# Patient Record
Sex: MALE | Race: WHITE | NOT HISPANIC OR LATINO | Employment: OTHER | URBAN - METROPOLITAN AREA
[De-identification: names, ages, dates, MRNs, and addresses within clinical notes are randomized per-mention and may not be internally consistent; named-entity substitution may affect disease eponyms.]

---

## 2018-05-02 ENCOUNTER — TRANSFERRED RECORDS (OUTPATIENT)
Dept: HEALTH INFORMATION MANAGEMENT | Facility: CLINIC | Age: 28
End: 2018-05-02

## 2019-08-12 ENCOUNTER — COMMUNICATION - HEALTHEAST (OUTPATIENT)
Dept: TELEHEALTH | Facility: CLINIC | Age: 29
End: 2019-08-12

## 2019-08-12 ENCOUNTER — OFFICE VISIT - HEALTHEAST (OUTPATIENT)
Dept: FAMILY MEDICINE | Facility: CLINIC | Age: 29
End: 2019-08-12
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
## 2019-08-12 ASSESSMENT — MIFFLIN-ST. JEOR: SCORE: 1746.72

## 2020-03-15 ENCOUNTER — HEALTH MAINTENANCE LETTER (OUTPATIENT)
Age: 30
End: 2020-03-15

## 2020-08-21 ENCOUNTER — OFFICE VISIT - HEALTHEAST (OUTPATIENT)
Dept: FAMILY MEDICINE | Facility: CLINIC | Age: 30
End: 2020-08-21

## 2020-08-21 DIAGNOSIS — Z91.030 BEE STING ALLERGY: ICD-10-CM

## 2020-08-21 RX ORDER — EPINEPHRINE 0.3 MG/.3ML
0.3 INJECTION SUBCUTANEOUS PRN
Qty: 1 | Refills: 0 | Status: SHIPPED | OUTPATIENT
Start: 2020-08-21 | End: 2024-03-27

## 2020-09-02 ENCOUNTER — AMBULATORY - HEALTHEAST (OUTPATIENT)
Dept: FAMILY MEDICINE | Facility: CLINIC | Age: 30
End: 2020-09-02

## 2020-09-02 ENCOUNTER — VIRTUAL VISIT (OUTPATIENT)
Dept: FAMILY MEDICINE | Facility: OTHER | Age: 30
End: 2020-09-02
Payer: COMMERCIAL

## 2020-09-02 DIAGNOSIS — Z20.822 SUSPECTED COVID-19 VIRUS INFECTION: ICD-10-CM

## 2020-09-02 PROCEDURE — 99421 OL DIG E/M SVC 5-10 MIN: CPT | Performed by: FAMILY MEDICINE

## 2020-09-03 NOTE — PROGRESS NOTES
"Date: 2020 08:08:02  Clinician: Candace Jung  Clinician NPI: [id number]  Patient: Garcia Sanches  Patient : 1990  Patient Address: [address] [place] [address] [address] [id number]  Patient Phone: [phone]  Visit Protocol: URI  Patient Summary:  Garcia is a 30 year old ( : 1990 ) male who initiated a Visit for COVID-19 (Coronavirus) evaluation and screening. When asked the question \"Please sign me up to receive news, health information and promotions from [employer].\", Garcia responded \"No\".    When asked when his symptoms started, Garcia reported that he does not have any symptoms.   He denies taking antibiotic medication in the past month and having recent facial or sinus surgery in the past 60 days.    Pertinent COVID-19 (Coronavirus) information  In the past 14 days, Garcia has not worked in a congregate living setting.   He does not work or volunteer as healthcare worker or a  and does not work or volunteer in a healthcare facility.   Garcia also has not lived in a congregate living setting in the past 14 days. He lives with a healthcare worker.   Garcia has had a close contact with a laboratory-confirmed COVID-19 patient in the last 14 days. Additional information about contact with COVID-19 (Coronavirus) patient as reported by the patient (free text): I am living with my wife who is a healthcare professional and she received her positive COVID results last night. We have been in close contact since she started to show symptoms including sleeping in the same bed and kissing.    Patient reported they are living in the same household with a COVID-19 positive patient.  Since 2019, Garcia and has not had upper respiratory infection or influenza-like illness. Has not been diagnosed with lab-confirmed COVID-19 test   Pertinent medical history  Garcia needs a return to work/school note.   Weight: 146 lbs   Garcia does not smoke or use smokeless tobacco.   Weight: 146 lbs    MEDICATIONS: No " current medications, ALLERGIES: NKDA  Clinician Response:  Dear Garcia,   Based on your exposure to COVID-19 (coronavirus), we would like to test you for this virus.  1. Please call [phone] to schedule your visit. Explain that you were referred by [facility] to have a COVID-19 test. Be ready [facility].  The following will serve as your written order for this COVID Test, ordered by me, for the indication of suspected COVID [Z20.828]: The test will be ordered in [employer], our electronic health record, after you are scheduled. It will show as ordered and authorized by Ankur Haskins MD.  Order: COVID-19 (coronavirus) PCR for ASYMPTOMATIC EXPOSURE testing from [facility].  If you know you have had close contact with someone who tested positive, you should be quarantined for 14 days after this exposure. You should stay in quarantine for the14 days even if the covid test is negative, the optimal time to test after exposure is 5-7 days from the exposure  Quarantine means   What should I do?  For safety, it's very important to follow these rules. Do this for 14 days after the date you were last exposed to the virus..  Stay home and away from others. Don't go to school or anywhere else. Generally quarantine means staying home from work but there are some exceptions to this. Please contact your workplace.   No hugging, kissing or shaking hands.  Don't let anyone visit.  Cover your mouth and nose with a mask, tissue or washcloth to avoid spreading germs.  Wash your hands and face often. Use soap and water.  What are the symptoms of COVID-19?  The most common symptoms are cough, fever and trouble breathing. Less common symptoms include headache, body aches, fatigue (feeling very tired), chills, sore throat, stuffy or runny nose, diarrhea (loose poop), loss of taste or smell, belly pain, and nausea or vomiting (feeling sick to your stomach or throwing up).  After 14 days, if you have still don't have symptoms, you  likely don't have this virus.  If you develop symptoms, follow these guidelines.  If you're normally healthy: Please start another [facility] visit to report your symptoms. Go to [email or web address].  If you have a serious health problem (like cancer, heart failure, an organ transplant or kidney disease): Call your specialty clinic. Let them know that you might have COVID-19.  2. When it's time for your COVID test:  Stay at least 6 feet away from others. (If someone will drive you to your test, stay in the backseat, as far away from the  as you can.)  Cover your mouth and nose with a mask, tissue or washcloth.  Go straight to the testing site. Don't make any stops on the way there or back.  Please note  Caregivers in these groups are at risk for severe illness due to COVID-19:  o People 65 years and older  o People who live in a nursing home or long-term care facility  o People with chronic disease (lung, heart, cancer, diabetes, kidney, liver, immunologic)  o People who have a weakened immune system, including those who:  Are in cancer treatment  Take medicine that weakens the immune system, such as corticosteroids  Had a bone marrow or organ transplant  Have an immune deficiency  Have poorly controlled HIV or AIDS  Are obese (body mass index of 40 or higher)  Smoke regularly  Where can I get more information?  [facility] -- About COVID-19: [email or web address]  CDC -- What to Do If You're Sick: www.cdc.gov/coronavirus/2019-ncov/about/steps-when-sick.html  CDC -- Ending Home Isolation: www.cdc.gov/coronavirus/2019-ncov/hcp/disposition-in-home-patients.html  CDC -- Caring for Someone: www.cdc.gov/coronavirus/2019-ncov/if-you-are-sick/care-for-someone.html  [facility] -- Interim Guidance for Hospital Discharge to Home: www.health.[facility].mn.us/diseases/coronavirus/hcp/hospdischarge.pdf  [facility] clinical trials (COVID-19 research studies): clinicalaffairs.[facility]/n-clinical-trials  Below are the  COVID-19 hotlines at the [place] Department of Health ([facility]). Interpreters are available.  For health questions: Call 426-343-7739 or 1-625.585.6632 (7 a.m. to 7 p.m.)  For questions about schools and childcare: Call 892-133-2942 or 1-778.740.8778 (7 a.m. to 7 p.m.)    Diagnosis: Contact with and (suspected) exposure to other viral communicable diseases  Diagnosis ICD: Z20.828

## 2020-09-04 ENCOUNTER — COMMUNICATION - HEALTHEAST (OUTPATIENT)
Dept: SCHEDULING | Facility: CLINIC | Age: 30
End: 2020-09-04

## 2020-09-21 ENCOUNTER — OFFICE VISIT - HEALTHEAST (OUTPATIENT)
Dept: ALLERGY | Facility: CLINIC | Age: 30
End: 2020-09-21

## 2020-09-21 DIAGNOSIS — Z91.030 BEE STING ALLERGY: ICD-10-CM

## 2020-10-29 ENCOUNTER — OFFICE VISIT - HEALTHEAST (OUTPATIENT)
Dept: ALLERGY | Facility: CLINIC | Age: 30
End: 2020-10-29

## 2020-10-29 DIAGNOSIS — Z91.030 BEE STING ALLERGY: ICD-10-CM

## 2020-11-03 ENCOUNTER — COMMUNICATION - HEALTHEAST (OUTPATIENT)
Dept: ALLERGY | Facility: CLINIC | Age: 30
End: 2020-11-03

## 2020-11-03 LAB — PAPER WASP IGE QN: <0.1 KU(A)/L

## 2020-11-24 ENCOUNTER — VIRTUAL VISIT (OUTPATIENT)
Dept: FAMILY MEDICINE | Facility: OTHER | Age: 30
End: 2020-11-24
Payer: COMMERCIAL

## 2020-11-24 PROCEDURE — 99421 OL DIG E/M SVC 5-10 MIN: CPT | Performed by: PHYSICIAN ASSISTANT

## 2020-11-25 ENCOUNTER — AMBULATORY - HEALTHEAST (OUTPATIENT)
Dept: FAMILY MEDICINE | Facility: CLINIC | Age: 30
End: 2020-11-25

## 2020-11-25 DIAGNOSIS — Z20.822 SUSPECTED 2019 NOVEL CORONAVIRUS INFECTION: ICD-10-CM

## 2020-11-25 NOTE — PROGRESS NOTES
"Date: 2020 20:36:03  Clinician: Bev Blank  Clinician NPI: [id number]  Patient: Garcia Sanches  Patient : 1990  Patient Address: [address] [place] [address] [place] [id number]  Patient Phone: [phone]  Visit Protocol: URI  Patient Summary:  Garcia is a 30 year old ( : 1990 ) male who initiated a [facility] Visit for COVID-19 (Coronavirus) evaluation and screening. When asked the question \"Please sign me up to receive news, health information and promotions from [facility].\", Garcia responded \"No\".    Garcia states his symptoms started gradually 5-6 days ago.   His symptoms consist of malaise and a cough.   Symptom details   Cough: Garcia coughs every 5-10 minutes and his cough is not more bothersome at night. Phlegm does not come into his throat when he coughs. He does not believe his cough is caused by post-nasal drip.    Garcia denies having vomiting, rhinitis, facial pain or pressure, myalgias, chills, sore throat, teeth pain, ageusia, diarrhea, ear pain, headache, wheezing, fever, nasal congestion, nausea, and anosmia. He also denies taking antibiotic medication in the past month, having recent facial or sinus surgery in the past 60 days, and double sickening (worsening symptoms after initial improvement). He is not experiencing dyspnea.   Precipitating events  He has not recently been exposed to someone with influenza. Garcia has been in close contact with the following high risk individuals: pregnant women and people with asthma, heart disease or diabetes.   Pertinent COVID-19 (Coronavirus) information  Garcia does not work or volunteer as healthcare worker or a . In the past 14 days, Garcia has not worked or volunteered at a healthcare facility or group living setting.   In the past 14 days, he also has not lived in a congregate living setting.   Garcia has not had a close contact with a laboratory-confirmed COVID-19 patient within 14 days of symptom onset.    Since 2019, Garcia has been " tested for COVID-19 and has not had upper respiratory infection or influenza-like illness.      Result of COVID-19 test: Negative     Date of his COVID-19 test: 11/09/2020      Pertinent medical history  Garcia does not need a return to work/school note.   Weight: 150 lbs   Garcia does not smoke or use smokeless tobacco.   Additional information as reported by the patient (free text): I traveled to [place] for work from 10/29/20 - 11/3/20 and was tested before and after (both negative).  My wife had a confirmed covid test 8/30/20.  Her symptoms were similar to how I am feeling now.  My wife is 30 weeks pregnant and I just want to make sure I limit my exposure until I find out results.   Weight: 150 lbs    MEDICATIONS: Mucinex DM oral, ALLERGIES: NKDA  Clinician Response:  Dear Garcia,   Your symptoms show that you may have coronavirus (COVID-19). This illness can cause fever, cough and trouble breathing. Many people get a mild case and get better on their own. Some people can get very sick.  Based on the symptoms you have shared, I would like you to be re-checked in 2 to 3 days. Please call your family clinic to set up a video or phone visit.  Will I be tested for COVID-19?  We would like to test you for this virus.   Please call [phone] to schedule your visit. Explain that you were referred by [facility] to have a COVID-19 test. Be ready to [facility] ID [facility].   * If you need to schedule in [place] or [facility] please call [phone] or for [employer] employees please call [phone].    The following will serve as your written order for this COVID Test, ordered by me, for the indication of suspected COVID [Z20.828]: The test will be ordered in [employer], our electronic health record, after you are scheduled. It will show as ordered and authorized by Ankur Haskins MD.  Order: COVID-19 (Coronavirus) PCR for SYMPTOMATIC testing from [facility].   1.When it's time for your COVID test:   Stay at least 6 feet away  "from others. (If someone will drive you to your test, stay in the backseat, as far away from the  as you can.)   Cover your mouth and nose with a mask, tissue or washcloth.  Go straight to the testing site. Don't make any stops on the way there or back.      2.Starting now: Stay home and away from others (self-isolate) until:   You've had no fever---and no medicine that reduces fever---for one full day (24 hours). And...   Your other symptoms have gotten better. For example, your cough or breathing has improved. And...   At least 10 days have passed since your symptoms started.       During this time, don't leave the house except for testing or medical care.   Stay in your own room, even for meals. Use your own bathroom if you can.   Stay away from others in your home. No hugging, kissing or shaking hands. No visitors.  Don't go to work, school or anywhere else.    Clean \"high touch\" surfaces often (doorknobs, counters, handles, etc.). Use a household cleaning spray or wipes. You'll find a full list of  on the EPA website: www.epa.gov/pesticide-registration/list-n-disinfectants-use-against-sars-cov-2.   Cover your mouth and nose with a mask, tissue or washcloth to avoid spreading germs.  Wash your hands and face often. Use soap and water.  Caregivers in these groups are at risk for severe illness due to COVID-19:  o People 65 years and older  o People who live in a nursing home or long-term care facility  o People with chronic disease (lung, heart, cancer, diabetes, kidney, liver, immunologic)   o People who have a weakened immune system, including those who:   Are in cancer treatment  Take medicine that weakens the immune system, such as corticosteroids  Had a bone marrow or organ transplant  Have an immune deficiency  Have poorly controlled HIV or AIDS  Are obese (body mass index of 40 or higher)  Smoke regularly   o Caregivers should wear gloves while washing dishes, handling laundry and cleaning " bedrooms and bathrooms.  o Use caution when washing and drying laundry: Don't shake dirty laundry, and use the warmest water setting that you can.  o For more tips, go to www.cdc.gov/coronavirus/2019-ncov/downloads/10Things.pdf.      How can I take care of myself?   Get lots of rest. Drink extra fluids (unless a doctor has told you not to)   Take Tylenol (acetaminophen) for fever or pain. If you have liver or kidney problems, ask your family doctor if it's okay to take Tylenol.   Adults can take either:    650 mg (two 325 mg pills) every 4 to 6 hours, or...   1,000 mg (two 500 mg pills) every 8 hours as needed.    Note: Don't take more than 3,000 mg in one day. Acetaminophen is found in many medicines (both prescribed and over-the-counter medicines). Read all labels to be sure you don't take too much.   For children, check the Tylenol bottle for the right dose. The dose is based on the child's age or weight.    If you have other health problems (like cancer, heart failure, an organ transplant or severe kidney disease): Call your specialty clinic if you don't feel better in the next 2 days.       Know when to call 911. Emergency warning signs include:    Trouble breathing or shortness of breath Pain or pressure in the chest that doesn't go away Feeling confused like you haven't felt before, or not being able to wake up Bluish-colored lips or face  Where can I get more information?   [facility] -- About COVID-19: [email or web address]   CDC -- What to Do If You're Sick: www.cdc.gov/coronavirus/2019-ncov/about/steps-when-sick.html   CDC -- Ending Home Isolation: www.cdc.gov/coronavirus/2019-ncov/hcp/disposition-in-home-patients.html   CDC -- Caring for Someone: www.cdc.gov/coronavirus/2019-ncov/if-you-are-sick/care-for-someone.html   [facility] -- Interim Guidance for Hospital Discharge to Home: www.health.[facility].mn./diseases/coronavirus/hcp/hospdischarge.pdf   [facility] clinical trials  (COVID-19 research studies): clinicalaffairs.[facility]-clinical-trials    Below are the COVID-19 hotlines at the [place] Department of Health ([facility]). Interpreters are available.    For health questions: Call 448-722-1382 or 1-192.813.2553 (7 a.m. to 7 p.m.) For questions about schools and childcare: Call 865-842-6691 or 1-693.661.7466 (7 a.m. to 7 p.m.)       Diagnosis: Contact with and (suspected) exposure to other viral communicable diseases  Diagnosis ICD: Z20.828

## 2020-11-27 ENCOUNTER — COMMUNICATION - HEALTHEAST (OUTPATIENT)
Dept: SCHEDULING | Facility: CLINIC | Age: 30
End: 2020-11-27

## 2021-01-10 ENCOUNTER — HEALTH MAINTENANCE LETTER (OUTPATIENT)
Age: 31
End: 2021-01-10

## 2021-01-18 ENCOUNTER — OFFICE VISIT - HEALTHEAST (OUTPATIENT)
Dept: FAMILY MEDICINE | Facility: CLINIC | Age: 31
End: 2021-01-18

## 2021-01-18 DIAGNOSIS — Z13.220 ENCOUNTER FOR SCREENING FOR LIPOID DISORDERS: ICD-10-CM

## 2021-01-18 DIAGNOSIS — Z00.00 ENCOUNTER FOR GENERAL ADULT MEDICAL EXAMINATION WITHOUT ABNORMAL FINDINGS: ICD-10-CM

## 2021-01-18 LAB
ALBUMIN SERPL-MCNC: 4.8 G/DL (ref 3.5–5)
ALP SERPL-CCNC: 73 U/L (ref 45–120)
ALT SERPL W P-5'-P-CCNC: 18 U/L (ref 0–45)
ANION GAP SERPL CALCULATED.3IONS-SCNC: 10 MMOL/L (ref 5–18)
AST SERPL W P-5'-P-CCNC: 17 U/L (ref 0–40)
BILIRUB SERPL-MCNC: 1 MG/DL (ref 0–1)
BUN SERPL-MCNC: 14 MG/DL (ref 8–22)
CALCIUM SERPL-MCNC: 9.6 MG/DL (ref 8.5–10.5)
CHLORIDE BLD-SCNC: 103 MMOL/L (ref 98–107)
CHOLEST SERPL-MCNC: 191 MG/DL
CO2 SERPL-SCNC: 27 MMOL/L (ref 22–31)
CREAT SERPL-MCNC: 0.8 MG/DL (ref 0.7–1.3)
FASTING STATUS PATIENT QL REPORTED: YES
GFR SERPL CREATININE-BSD FRML MDRD: >60 ML/MIN/1.73M2
GLUCOSE BLD-MCNC: 87 MG/DL (ref 70–125)
HDLC SERPL-MCNC: 47 MG/DL
HGB BLD-MCNC: 15.3 G/DL (ref 14–18)
HIV 1+2 AB+HIV1 P24 AG SERPL QL IA: NEGATIVE
LDLC SERPL CALC-MCNC: 131 MG/DL
POTASSIUM BLD-SCNC: 3.7 MMOL/L (ref 3.5–5)
PROT SERPL-MCNC: 7.3 G/DL (ref 6–8)
SODIUM SERPL-SCNC: 140 MMOL/L (ref 136–145)
TRIGL SERPL-MCNC: 63 MG/DL

## 2021-01-18 ASSESSMENT — MIFFLIN-ST. JEOR: SCORE: 1674.77

## 2021-01-19 LAB
25(OH)D3 SERPL-MCNC: 27.5 NG/ML (ref 30–80)
25(OH)D3 SERPL-MCNC: 27.5 NG/ML (ref 30–80)
HCV AB SERPL QL IA: NEGATIVE

## 2021-05-08 ENCOUNTER — HEALTH MAINTENANCE LETTER (OUTPATIENT)
Age: 31
End: 2021-05-08

## 2021-05-26 VITALS — HEART RATE: 99 BPM | OXYGEN SATURATION: 70 %

## 2021-05-31 NOTE — PROGRESS NOTES
"ASSESSMENT:   1. Reaction to insect bite  predniSONE (DELTASONE) 20 MG tablet       PLAN:  Does appear to be a reaction to the insect bite as opposed to a cellulitis.  Will treat with a 5-day course of prednisone, antihistamines.  Did discuss signs and symptoms of cellulitis with patient and encouraged him to return should fevers develop, redness worsen.    I discussed red flag symptoms, return precautions to clinic/ER and follow up care with patient/parent.  Expected clinical course, symptomatic cares advised. Questions answered. Patient/parent amenable with plan.    Patient Instructions:  Patient Instructions   Start prednisone daily today.    Benadryl 25-50mg every 4-6 hours as needed for itching, irritation.    If fevers develop, redness worsens, please return for recheck.    Prednisone Discharge Instructions:  Please take the steroid, Prednisone, for the full course as prescribed.  Take Prednisone with food or milk to minimize stomach upset.      Side effects of Prednisone include difficulty sleeping, increased appetite, weight gain, and changes in mood.  If you are diabetic, please monitor your blood sugar regularly while taking this medicine as Prednisone can cause high blood sugar.        SUBJECTIVE:   Garcia Sanches is a 29 y.o. male who presents today with redness and swelling to his left lower leg.  Patient reports that yesterday he was hiking, and felt a definite sting or bite to his left lower leg.  He examined the area immediately, and noted a \"black dot\" with a white area surrounding this.  It did not immediately turn red.  He did feel a stinging sensation for several hours following this.  When he awakened today, there was a large area warmth and redness to his lower leg.  He also began to swell.  His girlfriend then marked it with ink, and they now notes that the redness has receded from of the lower portion, however it has extended beyond the line laterally.  He notes that it does not hurt at rest, " "he does feel some irritation when his pants touch it.  He has not had any fevers, ill symptoms.  No discharge or weeping from the area.  Denies shortness of breath, wheezing, difficulty swallowing, swelling of the lips or tongue, vomiting.  Has not taken any medication or antihistamines for symptoms.      ROS:  Comprehensive 12 pt ROS completed, positives noted in HPI, otherwise negative.      Past Medical History:  There is no problem list on file for this patient.    Denies  Surgical History:  No past surgical history on file.    Denies    Family History:  No family history on file.    Reviewed; Non-contributory    Social History     Tobacco Use   Smoking Status Never Smoker   Smokeless Tobacco Never Used         Current Medications:  No current outpatient medications on file prior to visit.     No current facility-administered medications on file prior to visit.        Allergies:   No Known Allergies    OBJECTIVE:   Vitals:    08/12/19 1448   BP: 134/89   Patient Site: Right Arm   Patient Position: Sitting   Cuff Size: Adult Regular   Pulse: 76   Resp: 20   Temp: 98.3  F (36.8  C)   TempSrc: Oral   Weight: 158 lb 1 oz (71.7 kg)   Height: 6' 2\" (1.88 m)     Physical exam reveals a pleasant 29 y.o. male.   General: Appears healthy, alert and cooperative. Non-toxic appearance.  Eyes: nonicteric  Neck: supple and no adenopathy  Pulmonary/Chest: Even, unlabored  Heart: regular rate  Left lower extremity: There is a significant area of erythema overlying the posterior portion of the left lower leg just proximal to the Achilles.  This area is warm to the touch.  It is nontender to palpation.  There is no definite puncture or bite.  There is a swelling noted from the mid calf to the ankle when compared to the opposite extremity.  Calf is nontender.  CMS is intact.  Neuro: Alert, oriented. Non-focal.  Skin: pink, warm, dry.  Psychiatric: Normal mood and affect.  Normal judgement and thought content. Normal behavior.     "   RADIOLOGY    none  LABORATORY STUDIES    none      Ami Raymond, CNP

## 2021-05-31 NOTE — PATIENT INSTRUCTIONS - HE
Start prednisone daily today.    Benadryl 25-50mg every 4-6 hours as needed for itching, irritation.    If fevers develop, redness worsens, please return for recheck.    Prednisone Discharge Instructions:  Please take the steroid, Prednisone, for the full course as prescribed.  Take Prednisone with food or milk to minimize stomach upset.      Side effects of Prednisone include difficulty sleeping, increased appetite, weight gain, and changes in mood.  If you are diabetic, please monitor your blood sugar regularly while taking this medicine as Prednisone can cause high blood sugar.

## 2021-06-03 VITALS — HEIGHT: 74 IN | BODY MASS INDEX: 20.28 KG/M2 | WEIGHT: 158.06 LBS

## 2021-06-05 VITALS
WEIGHT: 145.7 LBS | HEIGHT: 73 IN | SYSTOLIC BLOOD PRESSURE: 124 MMHG | BODY MASS INDEX: 19.31 KG/M2 | DIASTOLIC BLOOD PRESSURE: 80 MMHG | HEART RATE: 66 BPM

## 2021-06-10 NOTE — PROGRESS NOTES
"Garcia Sanches is a 30 y.o. male who is being evaluated via a billable telephone visit.      The patient has been notified of following:     \"This telephone visit will be conducted via a call between you and your physician/provider. We have found that certain health care needs can be provided without the need for a physical exam.  This service lets us provide the care you need with a short phone conversation.  If a prescription is necessary we can send it directly to your pharmacy.  If lab work is needed we can place an order for that and you can then stop by our lab to have the test done at a later time.    Telephone visits are billed at different rates depending on your insurance coverage. During this emergency period, for some insurers they may be billed the same as an in-person visit.  Please reach out to your insurance provider with any questions.    If during the course of the call the physician/provider feels a telephone visit is not appropriate, you will not be charged for this service.\"    Patient has given verbal consent to a Telephone visit? Yes    What phone number would you like to be contacted at? [phone].    Patient would like to receive their AVS by N/A.    Additional provider notes:     New patient patient presents today with concerns of worsening reactions to bee stings.  He has had multiple bee stings which have led to significant swelling, erythema, and pain.  Most recently had a bee sting along his face which caused significant angioedema and a little bit of wheezing as well.  In the past he has used prednisone for this.  He also has over-the-counter cetirizine and diphenhydramine.  He has concerns about what to do for the next time of bee stings him and also is interested in allergy testing to confirm bee sting allergy.    Assessment/Plan:  1. Bee sting allergy    Discussed with the patient that he most likely does have an allergy to bee/wasp venom.  Discussed how these can at times " progressively become more severe with each subsequent sting.  Given the fact that he had some wheezing last time, concerns for increased risk of compromised airway should this happen again.  EpiPen to hold onto sent to the pharmacy.  Should he get localized reaction, prednisone to hold onto.  He would still like to discuss with an allergist.  Referral placed.    - EPINEPHrine (EPIPEN/ADRENACLICK/AUVI-Q) 0.3 mg/0.3 mL injection; Inject 0.3 mL (0.3 mg total) as directed as needed for anaphylaxis. Inject into thigh.  Dispense: 1 Pre-filled Pen Syringe; Refill: 0  - predniSONE (DELTASONE) 20 MG tablet; Take 40 mg by mouth daily for 5 days.  Dispense: 10 tablet; Refill: 0  - Ambulatory referral to Allergy        Phone call duration:  11 minutes

## 2021-06-11 NOTE — PROGRESS NOTES
"Garcia Sanches is a 30 y.o. male who is being evaluated via a billable video visit.      The patient has been notified of following:     \"This video visit will be conducted via a call between you and your physician/provider. We have found that certain health care needs can be provided without the need for an in-person physical exam.  This service lets us provide the care you need with a video conversation.  If a prescription is necessary we can send it directly to your pharmacy.  If lab work is needed we can place an order for that and you can then stop by our lab to have the test done at a later time.    Video visits are billed at different rates depending on your insurance coverage. Please reach out to your insurance provider with any questions.    If during the course of the call the physician/provider feels a video visit is not appropriate, you will not be charged for this service.\"    Patient has given verbal consent to a Video visit? yes  How would you like to obtain your AVS? My chart  If dropped by the video visit, the video invitation should be sent to:[phone]  Will anyone else be joining your video visit? no        Video Start Time:0808    Additional provider notes: Chief complaint: Bee sting allergy    History of present illness: This is a pleasant 30-year-old gentleman I was asked to see for evaluation by Bryan Gillis in regards to bee sting allergy.  Patient states a couple years ago he was in [place] and was stung on his ankle.  He thinks it was a wasp.  He states his entire joint swelled and throb.  He was given prednisone and the symptoms did resolve within a week.  He states last year he was stung on the other ankle and had a similar response.  He states the ankle became very red and swollen.  It turned purple.  The prednisone helped again.  This summer he is been stung 3 times.  2 of the swellings he had very large local swelling.  He states about a month ago he was stung on his left temple and his " whole face swelled and his left eye swelled shut.  He states he also had some wheezing.  This is the first time he had any breathing difficulty.  He states that resolved spontaneously.  He took some prednisone and Benadryl and the symptoms did resolve.  He is now has a prescription for prednisone and an EpiPen at home.  He thinks he was stung by a wasp.  He states he works outside as a .    Past medical history: Otherwise unremarkable    Social history: As listed in the history present illness, non-smoker    Family history: Noncontributory    Review of Systems performed as above and the remainder is negative.      Current Outpatient Medications:      EPINEPHrine (EPIPEN/ADRENACLICK/AUVI-Q) 0.3 mg/0.3 mL injection, Inject 0.3 mL (0.3 mg total) as directed as needed for anaphylaxis. Inject into thigh., Disp: 1 Pre-filled Pen Syringe, Rfl: 0    Allergies   Allergen Reactions     Bee Venom Protein (Honey Bee)      Weight 150 pounds, height 6 foot 2 inches tall, respiratory rate 14    Gen: Pleasant male not in acute distress  HEENT: Eyes no erythema of the bulbar or palpebral conjunctiva, no edema.  Nose: No congestion,  Mouth: Throat clear, no lip or tongue edema.   Neck: No swelling, full range of motion  Respiratory: No coughing with breathing, no retractions  Lymph: No supraclavicular or cervical lymphadenopathy  Skin: No rashes or lesions  Psych: Alert and oriented times 3    Impression report and plan:  1.  Bee sting allergy    I would like him to come in for skin testing.  I went over this process with him.  Continue to carry his epinephrine device.  I will provide him with a copy of anaphylaxis action plan.  I reviewed this with him today.  I did discuss briefly with him allergy shots.  He would be a candidate.  Stated that large local swelling but not qualifying for testing or shots, however, the wheezing does constitute as a systemic response.  If he has any further questions, he will  let me know.  We will schedule him for skin testing.      Video-Visit Details    Type of service:  Video Visit    Video End Time (time video stopped): 0820  Originating Location (pt. Location): home    Distant Location (provider location):  [facility]     Platform used for Video Visit:[employer]

## 2021-06-11 NOTE — PATIENT INSTRUCTIONS - HE
Epi at all times    Bee sting allergy testing 1-2 hours, off antihistamines for 5 days    Anaphylaxis action plan    ? Bee sting allergy shots

## 2021-06-12 NOTE — PROGRESS NOTES
Chief complaint: Bee sting allergy    History of present illness: This is a pleasant 30-year-old gentleman I saw virtually in September.  He had been stung and he had systemic symptoms of breathing difficulty and swelling of the face.  He is not sure what stung him.  He reports he is feeling well today.    Past medical history, social history, family medical history, meds and allergies reviewed and updated accordingly.        Review of Systems performed as above and the remainder is negative.         Current Outpatient Medications:      EPINEPHrine (EPIPEN/ADRENACLICK/AUVI-Q) 0.3 mg/0.3 mL injection, Inject 0.3 mL (0.3 mg total) as directed as needed for anaphylaxis. Inject into thigh., Disp: 1 Pre-filled Pen Syringe, Rfl: 0    Allergies   Allergen Reactions     Bee Venom Protein (Honey Bee)      Yellow jacket, yellow hornet, white faced hornet plus honeybee       Pulse 99   SpO2 (!) 70%   Gen: Pleasant male not in acute distress  HEENT: Eyes no erythema of the Skin: No rashes or lesions  Psych: Alert and oriented times 3    Last Venom Allergy Test Results  Test Information  Consent: Yes (10/29/20 1020)  Location: Back (10/29/20 1020)  Testing Nurse: ISABELLA (10/29/20 1020)  Reviewing Physician: Mick (10/29/20 1020)  Select Antigens: Select (10/29/20 1020)  White Faced Hornet  White Faced Hornet Prick 100 mcg/mL (W/F in mm): 0/0 (10/29/20 1020)  White Faced Hornet Intradermal 0.001 mcg/mL (W/F in mm): 0/0 (10/29/20 1020)  White Faced Hornet Intradermal 0.01 mcg/mL (W/F in mm): 0/0 (10/29/20 1020)  White Faced Hornet Intradermal 0.1 mcg/mL (W/F in mm): 0/0 (10/29/20 1020)  White Faced Hornet Intradermal 1.0 mcg/mL (W/F in mm): 9/23 (10/29/20 1020)  Wasp  Wasp Prick 100 mcg/mL (W/F in mm): 0/0 (10/29/20 1020)  Wasp Intradermal 0.001 mcg/mL (W/F in mm): 0/0 (10/29/20 1020)  Wasp Intradermal 0.01 mcg/mL (W/F in mm): 0/0 (10/29/20 1020)  Wasp Intradermal 0.1 mcg/mL (W/F in m): 0/0 (10/29/20 1020)  Wasp Intradermal 1.0 mcg/mL  (W/F in mm): 0/0 (10/29/20 1020)  Yellow Hornet  Yellow Hornet Prick 100 mcg/mL (W/F in mm): 0/0 (10/29/20 1020)  Yellow Hornet Intradermal 0.001 mcg/mL (W/F in mm): 0/0 (10/29/20 1020)  Yellow Hornet Intradermal 0.01 mcg/mL (W/F in mm): 0/0 (10/29/20 1020)  Yellow Hornet Intradermal 0.1 mcg/mL (W/F in mm): 0/0 (10/29/20 1020)  Yellow Hornet Intradermal 1.0 mcg/mL (W/F in mm): 10/34 (10/29/20 1020)  Honey Bee  Honey Bee Intradermal 0.001 mcg/mL (W/F in mm): 9/16 (10/29/20 1020)  Honey Bee Intradermal 0.01 mcg/mL (W/F in mm): 0/0 (10/29/20 1020)  Honey Bee Intradermal 0.1 mcg/mL (W/F in mm): 0/0 (10/29/20 1020)  Honey Bee Intradermal 1.0 mcg/mL (W/F in mm): 0/0 (10/29/20 1020)  Honey Bee Prick 100 mcg/mL (W/F in mm): 0/0 (10/29/20 1020)  Yellow Jacket  Yellow Jacket Prick 100 mcg/mL (W/F in mm): 4/F (10/29/20 1020)  Controls  Prick Neg. Control: 50% Glycerine-Saline H (W/F in mm): 0/0 (10/29/20 1020)  Prick Pos. Control: Histamine 6 mg/ml (W/F in mm): 5/10 (10/29/20 1020)  Intradermal Neg. Control: 50% Glycerine-Saline H (W/F in mm): 0/0 (10/29/20 1020)          Impression report and plan:    1.  Bee sting allergy    Testing positive for all venomous insects except wasp.  I would like to check specific IgE to wasp.  I went over the risks and benefits of allergy shots.  I stated risks include hives, swelling, shortness of breath.  I did state that one in 2.5 million shot administrations can result in death.  I stated they must wait in the office for 30 minutes following the shot and carry an epinephrine device on the day of the shot.  I stated that shots are effective in about 90% of patients.  I stated that they should check with the insurance company prior to proceeding.  They understand the risks and benefits and will let me know his decision after his blood testing returns.  He has a current epinephrine device.    Time spent with the patient, 15 minutes, greater than half spent counseling and coordination of care  regarding bee sting allergy

## 2021-06-18 NOTE — PATIENT INSTRUCTIONS - HE
Patient Instructions by Chloé Vergara FNP at 1/18/2021  3:40 PM     Author: Chloé Vergara FNP Service: -- Author Type: Nurse Practitioner    Filed: 1/18/2021  4:08 PM Encounter Date: 1/18/2021 Status: Signed    : Chloé Vergara FNP (Nurse Practitioner)           Preventing Skin Cancer     Use sunscreen of SPF 30 or greater. Apply liberally.   Relaxing in the sun may feel good. But it isnt good for your skin. In fact, the suns harmful rays are the major cause of skin cancer. This is a serious disease that can be life-threatening. People of all ages, races, and backgrounds are at risk.  Skin cancer is the most common cancer in the [place] But in most cases, it can be prevented.  Your role in prevention  You can act today to help prevent skin cancer. Start by avoiding the suns UV (ultraviolet) rays. And dont use tanning beds or lamps. They are no safer than the sun. Taking these steps can help keep you from getting skin cancer. It can also help prevent wrinkles and other aging effects caused by the sun. Make sure your children also follow these safeguards. Now is the time to start taking steps to prevent skin cancer.  When you are outdoors  Protect your skin when you go out during the day. Take safety steps whenever you go out to eat, run errands by car or on foot, or do any outdoor activity. There isnt just one easy way to protect your skin. Its best to follow all of these steps:    Wear tightly woven clothing that covers your skin. Put on a wide-brimmed hat to protect your face, ears, and scalp.    Watch the clock. Try to stay out of the sun between 10 a.m. and 4 p.m. That's when the sun's rays are strongest.    Head for the shade or create your own. Use an umbrella when sitting or strolling.    Know that the suns rays can reflect off sand, water, and snow. This can harm your skin. Take extra care when you are near reflective surfaces.    Keep in mind that even when the weather is hazy or cloudy,  "your skin can be exposed to strong UV rays.    Shield your skin with sunscreen. Also use sunscreen on your childrens skin. Keep babies younger than 6 months old out of the sun.  Tips for using sunscreen  To help prevent skin cancer, choose the right sunscreen and use it correctly. Try these tips:    Choose a sunscreen that has an SPF (sun protection factor) of at least 30. Also choose a sunscreen labeled \"broad spectrum. This will protect you from both UVA and UVB (ultraviolet A and B) rays.    If one brand irritates your skin, try another, such as one without fragrance.    Use a water-resistant sunscreen if you swim or sweat.    Use at least 1 ounce of sunscreen to cover exposed areas. This is enough to fill a shot glass. You might need to adjust the amount depending on your body size.    Put the sunscreen on dry skin about 15 minutes before going outdoors. This gives it time to soak in.    Reapply sunscreen every 2 hours. If youre active, do this more often.    Cover any sun-exposed skin, from your face to your feet. Dont forget your scalp, ears, and lips.    Know that while sunscreen helps protect you, it isnt enough. Sunscreens extend the length of time you can be outdoors before your skin starts to get red. But they don't give you total protection. Using sunscreen doesn't mean you can stay out in the sun for an unlimited time. Your skin cells are still being damaged. You should also wear protective clothing. And try to stay out of the sun as much as you can, especially from 10 a.m. to 4 p.m.  Date Last Reviewed: 7/1/2019 2000-2019 [employer]. [address] [place] [address], [place] [address]. All rights reserved. This information is not intended as a substitute for professional medical care. Always follow your healthcare professional's instructions.        Patient Education   Understanding USDA MyPlate  The USDA (US Department of Agriculture) has guidelines to help you make healthy food choices. These " are called MyPlate. MyPlate shows the food groups that make up healthy meals using the image of a place setting. Before you eat, think about the healthiest choices for what to put onto your plate or into your cup or bowl. To learn more about building a healthy plate, visit www.choosemyplate.gov.       The Food Groups    Fruits: Any fruit or 100% fruit juice counts as part of the Fruit Group. Fruits may be fresh, canned, frozen, or dried, and may be whole, cut-up, or pureed. Make half your plate fruits and vegetables.    Vegetables: Any vegetable or 100% vegetable juice counts as a member of the Vegetable Group. Vegetables may be fresh, frozen, canned, or dried. They can be served raw or cooked and may be whole, cut-up, or mashed. Make half your plate fruits and vegetables.     Grains: All foods made from grains are part of the Grains Group. These include wheat, rice, oats, cornmeal, and barley such as bread, pasta, oatmeal, cereal, tortillas, and grits. Grains should be no more than a quarter of your plate. At least half of your grains should be whole grains.    Protein: This group includes meat, poultry, seafood, beans and peas, eggs, processed soy products (like tofu), nuts (including nut butters), and seeds. Make protein choices no more than a quarter of your plate. Meat and poultry choices should be lean or low fat.    Dairy: All fluid milk products and foods made from milk that contain calcium, like yogurt and cheese are part of the Dairy Group. (Foods that have little calcium, such as cream, butter, and cream cheese, are not part of the group.) Most dairy choices should be low-fat or fat-free.    Oils: These are fats that are liquid at room temperature. They include canola, corn, olive, soybean, and sunflower oil. Foods that are mainly oil include mayonnaise, certain salad dressings, and soft margarines. You should have only 5 to 7 teaspoons of oils a day. You probably already get this much from the food you  eat.  Use [employer] to Help Build Your Meals  The [employer] can help you plan and track your meals and activity. You can look up individual foods to see or compare their nutritional value. You can get guidelines for what and how much you should eat. You can compare your food choices. And you can assess personal physical activities and see ways you can improve. Go to www.[employer].gov/[employer]Geekcker/.    4366-0387 [employer]. [address] [place] [address], [place] [address]. All rights reserved. This information is not intended as a substitute for professional medical care. Always follow your healthcare professional's instructions.

## 2021-06-20 NOTE — LETTER
"Letter by Renetta Barton MD at      Author: Renetta Barton MD Service: -- Author Type: --    Filed:  Encounter Date: 9/21/2020 Status: (Other)         Bryan Gillis CNP  [address]  [address]  [facility] [address]                                  September 21, 2020    Patient: Garcia Sanches   MR Number: [id number]   YOB: 1990   Date of Visit: 9/21/2020     Dear Mr. Carlin CNP:    Thank you for referring Garcia Sanches to me for evaluation.  I have asked him to come in for bee sting allergy testing.  I have included my note for review.    If you have questions, please do not hesitate to call me. I look forward to following Garcia along with you.    Sincerely,        Renetta Barton MD          CC  No Recipients  Renetta Barton MD  9/21/2020  8:36 AM  Sign when Signing Visit  Garcia Sanches is a 30 y.o. male who is being evaluated via a billable video visit.      The patient has been notified of following:     \"This video visit will be conducted via a call between you and your physician/provider. We have found that certain health care needs can be provided without the need for an in-person physical exam.  This service lets us provide the care you need with a video conversation.  If a prescription is necessary we can send it directly to your pharmacy.  If lab work is needed we can place an order for that and you can then stop by our lab to have the test done at a later time.    Video visits are billed at different rates depending on your insurance coverage. Please reach out to your insurance provider with any questions.    If during the course of the call the physician/provider feels a video visit is not appropriate, you will not be charged for this service.\"    Patient has given verbal consent to a Video visit? yes  How would you like to obtain your AVS? My chart  If dropped by the video visit, the video invitation should be sent to:[phone]  Will anyone else be joining your " video visit? no        Video Start Time:0808    Additional provider notes: Chief complaint: Bee sting allergy    History of present illness: This is a pleasant 30-year-old gentleman I was asked to see for evaluation by Bryan Gillis in regards to bee sting allergy.  Patient states a couple years ago he was in [place] and was stung on his ankle.  He thinks it was a wasp.  He states his entire joint swelled and throb.  He was given prednisone and the symptoms did resolve within a week.  He states last year he was stung on the other ankle and had a similar response.  He states the ankle became very red and swollen.  It turned purple.  The prednisone helped again.  This summer he is been stung 3 times.  2 of the swellings he had very large local swelling.  He states about a month ago he was stung on his left temple and his whole face swelled and his left eye swelled shut.  He states he also had some wheezing.  This is the first time he had any breathing difficulty.  He states that resolved spontaneously.  He took some prednisone and Benadryl and the symptoms did resolve.  He is now has a prescription for prednisone and an EpiPen at home.  He thinks he was stung by a wasp.  He states he works outside as a .    Past medical history: Otherwise unremarkable    Social history: As listed in the history present illness, non-smoker    Family history: Noncontributory    Review of Systems performed as above and the remainder is negative.      Current Outpatient Medications:   ?  EPINEPHrine (EPIPEN/ADRENACLICK/AUVI-Q) 0.3 mg/0.3 mL injection, Inject 0.3 mL (0.3 mg total) as directed as needed for anaphylaxis. Inject into thigh., Disp: 1 Pre-filled Pen Syringe, Rfl: 0    Allergies   Allergen Reactions   ? Bee Venom Protein (Honey Bee)      Weight 150 pounds, height 6 foot 2 inches tall, respiratory rate 14    Gen: Pleasant male not in acute distress  HEENT: Eyes no erythema of the bulbar or palpebral  conjunctiva, no edema.  Nose: No congestion,  Mouth: Throat clear, no lip or tongue edema.   Neck: No swelling, full range of motion  Respiratory: No coughing with breathing, no retractions  Lymph: No supraclavicular or cervical lymphadenopathy  Skin: No rashes or lesions  Psych: Alert and oriented times 3    Impression report and plan:  1.  Bee sting allergy    I would like him to come in for skin testing.  I went over this process with him.  Continue to carry his epinephrine device.  I will provide him with a copy of anaphylaxis action plan.  I reviewed this with him today.  I did discuss briefly with him allergy shots.  He would be a candidate.  Stated that large local swelling but not qualifying for testing or shots, however, the wheezing does constitute as a systemic response.  If he has any further questions, he will let me know.  We will schedule him for skin testing.      Video-Visit Details    Type of service:  Video Visit    Video End Time (time video stopped): 0820  Originating Location (pt. Location): home    Distant Location (provider location):  [facility]     Platform used for Video Visit:[employer]

## 2021-06-30 NOTE — PROGRESS NOTES
Progress Notes by Chloé Vergara FNP at 1/18/2021  3:40 PM     Author: Chloé Vergara FNP Service: -- Author Type: Nurse Practitioner    Filed: 1/18/2021  4:34 PM Encounter Date: 1/18/2021 Status: Signed    : Chloé Vergara FNP (Nurse Practitioner)       MALE PREVENTATIVE EXAM    Assessment and Plan:   Patient has been advised of split billing requirements and indicates understanding: Yes    1. Encounter for general adult medical examination without abnormal findings  Healthy male exam  - Lipid [place]- FASTING  - Hepatitis C Antibody (Anti-HCV) (pts born [id number])  - HIV Antigen/Antibody Screening [facility]  - Comprehensive Metabolic Panel  - Vitamin D, Total (25-Hydroxy)  - Hemoglobin    2. Encounter for screening for lipoid disorders  - Lipid [place]- FASTING     Next follow up:  No follow-ups on file.    Immunization Review  Adult Imm Review: No immunizations due today    I discussed the following with the patient:   Adult Healthy Living: Importance of regular exercise  Healthy nutrition  Getting adequate sleep  Stress management  Use of seat belts  Distracted driving  Helmets  Sporting equipment safety  Firearm safety  Supplement use  Herbal medications/alternative medical therapies    I have had an Advance Directives discussion with the patient.    Subjective:   Chief Complaint: Garcia Sanches is an 30 y.o. male here for a preventative health visit.    Patient has been advised of split billing requirements and indicates understanding: Yes  HPI: Patient has no concerns today him and his wife are expecting their first child in 2 weeks.  Patient denies chest pain, shortness of breath, syncope, fever and chills.    Healthy Habits  Are you taking a daily aspirin? No  Do you typically exercising at least 40 min, 3-4 times per week?  NO  Do you usually eat at least 4 servings of fruit and vegetables a day, include whole grains and fiber and avoid regularly eating high fat foods? NO  Have you had an  "eye exam in the past two years? NO  Do you see a dentist twice per year? Yes  Do you have any concerns regarding sleep? No    Safety Screen  If you own firearms, are they secured in a locked gun cabinet or with trigger locks? The patient does not own any firearms  No data recorded    Review of Systems:  Please see above.  The rest of the review of systems are negative for all systems.     Cancer Screening     Patient has no health maintenance due at this time          Patient Care Team:  Bryan Gillis CNP as PCP - General (Nurse Practitioner)  Renetta Barton MD as Assigned Pulmonology Provider        History     Not marked as reviewed during this visit.            Objective:   Vital Signs: There were no vitals taken for this visit.       PHYSICAL EXAM  /80   Pulse 66   Ht 6' 1\" (1.854 m)   Wt 145 lb 11.2 oz (66.1 kg)   BMI 19.22 kg/m    General appearance: alert, appears stated age and cooperative  Head: Normocephalic, without obvious abnormality, atraumatic  Eyes: conjunctivae/corneas clear. PERRL, EOM's intact. Fundi benign.  Ears: normal TM's and external ear canals both ears  Throat: lips, mucosa, and tongue normal; teeth and gums normal  Neck: no adenopathy, no carotid bruit, no JVD, supple, symmetrical, trachea midline and thyroid not enlarged, symmetric, no tenderness/mass/nodules  Back: symmetric, no curvature. ROM normal. No CVA tenderness.  Lungs: clear to auscultation bilaterally  Chest wall: no tenderness  Heart: regular rate and rhythm, S1, S2 normal, no murmur, click, rub or gallop  Abdomen: soft, non-tender; bowel sounds normal; no masses,  no organomegaly  Male genitalia: normal, deferred  Extremities: extremities normal, atraumatic, no cyanosis or edema  Pulses: 2+ and symmetric  Skin: Skin color, texture, turgor normal. No rashes or lesions  Lymph nodes: Cervical, supraclavicular, and axillary nodes normal.  Neurologic: Grossly normal       Medication List          Accurate as of " January 18, 2021  4:33 PM. If you have any questions, ask your nurse or doctor.            CONTINUE taking these medications    EPINEPHrine 0.3 mg/0.3 mL injection  Also known as: EPIPEN/ADRENACLICK/AUVI-Q  INSTRUCTIONS: Inject 0.3 mL (0.3 mg total) as directed as needed for anaphylaxis. Inject into thigh.  Doctor's comments: Please dispense product with lowest cost to patient               Additional Screenings Completed Today:

## 2021-07-18 ENCOUNTER — OFFICE VISIT (OUTPATIENT)
Dept: URGENT CARE | Facility: URGENT CARE | Age: 31
End: 2021-07-18
Payer: COMMERCIAL

## 2021-07-18 ENCOUNTER — E-VISIT (OUTPATIENT)
Dept: URGENT CARE | Facility: URGENT CARE | Age: 31
End: 2021-07-18
Payer: COMMERCIAL

## 2021-07-18 VITALS
TEMPERATURE: 98 F | OXYGEN SATURATION: 97 % | DIASTOLIC BLOOD PRESSURE: 72 MMHG | SYSTOLIC BLOOD PRESSURE: 118 MMHG | HEART RATE: 100 BPM

## 2021-07-18 DIAGNOSIS — Z20.822 PERSON UNDER INVESTIGATION FOR COVID-19: ICD-10-CM

## 2021-07-18 DIAGNOSIS — J02.9 SORE THROAT: ICD-10-CM

## 2021-07-18 DIAGNOSIS — J06.9 UPPER RESPIRATORY TRACT INFECTION, UNSPECIFIED TYPE: Primary | ICD-10-CM

## 2021-07-18 DIAGNOSIS — R68.89 FLU-LIKE SYMPTOMS: Primary | ICD-10-CM

## 2021-07-18 LAB
DEPRECATED S PYO AG THROAT QL EIA: NEGATIVE
SARS-COV-2 RNA RESP QL NAA+PROBE: NEGATIVE

## 2021-07-18 PROCEDURE — U0005 INFEC AGEN DETEC AMPLI PROBE: HCPCS | Performed by: PHYSICIAN ASSISTANT

## 2021-07-18 PROCEDURE — 87651 STREP A DNA AMP PROBE: CPT | Performed by: PHYSICIAN ASSISTANT

## 2021-07-18 PROCEDURE — 99207 PR NON-BILLABLE SERV PER CHARTING: CPT | Performed by: PHYSICIAN ASSISTANT

## 2021-07-18 PROCEDURE — 99213 OFFICE O/P EST LOW 20 MIN: CPT | Performed by: PHYSICIAN ASSISTANT

## 2021-07-18 PROCEDURE — U0003 INFECTIOUS AGENT DETECTION BY NUCLEIC ACID (DNA OR RNA); SEVERE ACUTE RESPIRATORY SYNDROME CORONAVIRUS 2 (SARS-COV-2) (CORONAVIRUS DISEASE [COVID-19]), AMPLIFIED PROBE TECHNIQUE, MAKING USE OF HIGH THROUGHPUT TECHNOLOGIES AS DESCRIBED BY CMS-2020-01-R: HCPCS | Performed by: PHYSICIAN ASSISTANT

## 2021-07-18 NOTE — PATIENT INSTRUCTIONS
Follow up immediately with severe headache, chest pain, or shortness of breath    Rest, isolate for 10 days, hydrate, follow up if worsening or new symptoms  Household members to isolate until test results, if positive isolate for 2 weeks and follow up for testing if symptoms occur         Patient Education     Coronavirus Disease 2019 (COVID-19): Caring for Yourself or Others   If you or a household member have symptoms of COVID-19, follow the guidelines below. This will help you manage symptoms and keep the virus from spreading.  If you have symptoms of COVID-19    Stay home and contact your care team. They will tell you what to do.    Don t panic. Keep in mind that other illnesses can cause similar symptoms.    Stay away from work, school, and public places.    Limit physical contact with others in your home. Limit visitors. No kissing.  Clean surfaces you touch with disinfectant.  If you need to cough or sneeze, do it into a tissue. Then throw the tissue into the trash. If you don't have tissues, cough or sneeze into the bend of your elbow.  Don t share food or personal items with people in your household. This includes items like eating and drinking utensils, towels, and bedding.  Wear a cloth face mask around other people. During a public health emergency, medical face masks may be reserved for healthcare workers. You may need to make a cloth face mask of your own. You can do this using a bandana, T-shirt, or other cloth. The CDC has instructions on how to make a face mask. Wear the mask so that it covers both your nose and mouth.  If you need to go to a hospital or clinic, call ahead to let them know. Expect the care team to wear masks, gowns, gloves, and eye protection. You may need to come to a different entrance or wait in a separate room.  Follow all instructions from your care team.    If you ve been diagnosed with COVID-19    Stay home and away from others, including other people in your home. (This is  called self-isolation.) Don t leave home unless you need to get medical care. Don t go to work, school, or public places. Don t use buses, taxis, or other public transportation.    Follow all instructions from your care team.    If you need to go to a hospital or clinic, call ahead to let them know. Expect the care team to wear masks, gowns, gloves, and eye protection. You may need to come to a different entrance or wait in a separate room.    Wear a face mask over your nose and mouth. This is to protect others from your germs. If you can t wear a mask, your caregivers should wear one. You may need to make your own mask using a bandana, T-shirt, or other cloth. See the CDC s instructions on how to make a face mask.    Have no contact with pets and other animals.    Don t share food or personal items with people in your household. This includes items like eating and drinking utensils, towels, and bedding.    If you need to cough or sneeze, do it into a tissue. Then throw the tissue into the trash. If you don't have tissues, cough or sneeze into the bend of your elbow.    Wash your hands often.    Self-care at home   At this time, there is no medicine approved to prevent or treat COVID-19. The FDA has approved an antiviral medicine (called remdesivir) for people being treated in the hospital. This is for people 12 years and older who weigh more than about 88 pounds (40 kgs). In certain cases, it may also be used for people younger than 12 years or who weigh less than about 88 pounds (40 kgs)..  Currently, treatment is mostly aimed at helping your body while it fights the virus.    Getting extra rest.    Drink extra fluids 6 to 8 glasses of liquids each day), unless a doctor has told you not to. Ask your care team which fluids are best for you. Avoid fluids that contain caffeine or alcohol.    Taking over-the-counter (OTC) medicine to reduce pain and fever. Your care team will tell you which medicine to use.  If you ve  been in the hospital for COVID-19, follow your care team s instructions. They will tell you when to stop self-isolation. They may also have you change positions to help your breathing (such as lying on your belly).  If a test showed that you have COVID-19, you may be asked to donate plasma after you ve recovered. (This is called COVID-19 convalescent plasma donation.) The plasma may contain antibodies to help fight the virus in others. Experts don't know if the plasma will work well as a treatment. Research continues, and the FDA has approved it for emergency use in certain people with serious or life-threatening COVID-19. For more information, talk to your care team.  Caring for a sick person     Follow all instructions from the care team.    Wash your hands often.    Wear protective clothing as advised.    Make sure the sick person wears a mask. If they can't wear a mask, don t stay in the same room with them. If you must be in the same room, wear a face mask. Make sure the mask covers both the nose and mouth.    Keep track of the sick person s symptoms.    Clean surfaces often with disinfectant. This includes phones, kitchen counters, fridge door handles, bathroom surfaces, and others.    Don t let anyone share household items with the sick person. This includes eating and drinking tools, towels, sheets, or blankets.    Clean fabrics and laundry well.    Keep other people and pets away from the sick person.    When you can stop self-isolation  When you are sick with COVID-19, you should stay away from other people. This is called self-isolation. The rules for ending self-isolation depend on your health in general.  If you are normally healthy:  You can stop self-isolation when all 3 of these are true:    You ve had no fever--and no medicine that reduces fever--for at least 24 hours. And     Your symptoms are better, such as cough or trouble breathing. And     At least 10 days have passed since your symptoms first  started.  Talk with your care team before you leave home. They may tell you it s okay to leave, or they may give you different advice. You do not need to be re-tested.  If you have a weak immune system, or you ve had severe COVID-19:  Follow your care team s instructions. You may be asked to self-isolate for 10 days to 20 days after your symptoms first started. Your care team may want to re-test you for COVID-19.  Note: If you re being treated for cancer, have an immune disorder (such as HIV), or have had a transplant (organ or bone marrow), you may have a weak immune system.  If you've already had COVID-19 once:  If you had the virus over 3 months ago, and you ve been exposed again, treat it like you've never had COVID-19. Stay home, limit your contact with others, call your care team, and watch for symptoms.  If it s been less than 3 months since you had the virus, you re symptom-free, and you ve been exposed again: You don t need to self-isolate or be re-tested. But if you develop new symptoms that can t be linked to another illness, please self-isolate and contact your care team.  When you return to public settings  When you are well enough to go outside your home, follow the CDC s guidance on cloth face masks.    Anyone over age 2 should wear a face mask in public, especially when it's hard to socially distance. This includes public transit, public protests and marches, and crowded stores, bars, and restaurants.    Face masks are most likely to reduce the spread of COVID-19 when they are widely used by people who are out in the public.  Certain people should not wear a face covering. These include:    Children under 2 years old    Anyone with a health, developmental, or mental health condition that can be made worse by wearing a mask    Anyone who is unconscious or unable to remove the face covering without help. See the CDC's guidance on who should not wear a face mask.  When to call your care team  Call your  care team right away if a sick person has any of these:    Trouble breathing    Pain or pressure in chest  If a sick person has any of these, call 911:    Trouble breathing that gets worse    Pain or pressure in chest that gets worse    Blue tint to lips or face    Fast or irregular heartbeat    Confusion or trouble waking    Fainting or loss of consciousness    Coughing up blood  Going home from the hospital   If you have COVID-19 and were recently in the hospital:    Follow the instructions above for self-care and isolation.    Follow the hospital care team s instructions.    Ask questions if anything is unclear to you. Write down answers so you remember them.  Date last modified: 11/23/2020  [employer] last reviewed this educational content on 4/1/2020  This information has been modified by your health care provider with permission from the publisher.    0119-2837 [employer]. [address] [place] [address]. All rights reserved. This information is not intended as a substitute for professional medical care. Always follow your healthcare professional's instructions.             Patient Education     Self-Care for Sore Throats     Sore throats happen for many reasons, such as colds, allergies, cigarette smoke, air pollution, and infections caused by viruses or bacteria. In any case, your throat becomes red and sore. Your goal for self-care is to reduce your discomfort while giving your throat a chance to heal.  Moisten and soothe your throat  Tips include the following:    Try a sip of water first thing after waking up.    Keep your throat moist by drinking 6 or more glasses of clear liquids every day.    Run a cool-air humidifier in your room overnight.    Stay away from cigarette smoke.     Check the air quality index,if air pollution gives you a sore throat. On high pollution days, try to limit outdoor time.    Suck on throat lozenges, cough drops, hard candy, ice chips, or frozen  fruit-juice bars. Use the sugar-free versions if your diet or medical condition requires them.  Gargle to ease irritation  Gargling every hour or 2 can ease irritation. Try gargling with 1 of these solutions:    1/4 teaspoon of salt in 1/2 cup of warm water    An over-the-counter anesthetic gargle  Use medicine for more relief  Over-the-counter medicine can reduce sore throat symptoms. Ask your pharmacist if you have questions about which medicine to use. To prevent possible medicine interactions, let the pharmacist know what medicines you take. To decrease symptoms:    Ease pain with anesthetic sprays. Aspirin or an aspirin substitute also helps. Remember, never give aspirin to anyone 18 or younger. Don't take aspirin if you are already taking blood thinners.     For sore throats caused by allergies, try antihistamines to block the allergic reaction.  Unless a sore throat is caused by a bacterial infection, antibiotics won t help you.  Prevent future sore throats  Prevention tips include:    Stop smoking or reduce contact with secondhand smoke. Smoke irritates the tender throat lining.    Limit contact with pets and with allergy-causing substances, such as pollen and mold.    Wash your hands often when you re around someone with a sore throat or cold. This will keep viruses or bacteria from spreading.    Limit outdoor time when air pollution is bad.    Don t strain your vocal cords.  When to call your healthcare provider  Contact your healthcare provider if you have:    Fever of 100.4 F (38.0 C) or higher, or as directed by your healthcare provider    White spots on the throat    Great Trouble swallowing    A skin rash    Recent exposure to someone else with strep bacteria    Severe hoarseness and swollen glands in the neck or jaw  Call 911  Call 911 if any of the following occur:    Trouble breathing or catching your breath    Drooling and problems swallowing    Wheezing    Unable to talk    Feeling dizzy or  faint    Feeling of doom  [employer] last reviewed this educational content on 9/1/2019 2000-2021 [employer]. All rights reserved. This information is not intended as a substitute for professional medical care. Always follow your healthcare professional's instructions.           Patient Education     Viral Upper Respiratory Illness (Adult)    You have a viral upper respiratory illness (URI), which is another term for the common cold. This illness is contagious during the first few days. It is spread through the air by coughing and sneezing. It may also be spread by direct contact (touching the sick person and then touching your own eyes, nose, or mouth). Frequent handwashing will decrease risk of spread. Most viral illnesses go away within 7 to 10 days with rest and simple home remedies. Sometimes the illness may last for several weeks. Antibiotics will not kill a virus, and they are generally not prescribed for this condition.  Home care    If symptoms are severe, rest at home for the first 2 to 3 days. When you resume activity, don't let yourself get too tired.    Don't smoke. If you need help stopping, talk with your healthcare provider.    Avoid being exposed to cigarette smoke (yours or others ).    You may use acetaminophen or ibuprofen to control pain and fever, unless another medicine was prescribed. If you have chronic liver or kidney disease, have ever had a stomach ulcer or gastrointestinal bleeding, or are taking blood-thinning medicines, talk with your healthcare provider before using these medicines. Aspirin should never be given to anyone under 18 years of age who is ill with a viral infection or fever. It may cause severe liver or brain damage.    Your appetite may be poor, so a light diet is fine. Stay well hydrated by drinking 6 to 8 glasses of fluids per day (water, soft drinks, juices, tea, or soup). Extra fluids will help loosen secretions in the nose and  lungs.    Over-the-counter cold medicines will not shorten the length of time you re sick, but they may be helpful for the following symptoms: cough, sore throat, and nasal and sinus congestion. If you take prescription medicines, ask your healthcare provider or pharmacist which over-the-counter medicines are safe to use. (Note: Don't use decongestants if you have high blood pressure.)  Follow-up care  Follow up with your healthcare provider, or as advised.  When to seek medical advice  Call your healthcare provider right away if any of these occur:    Cough with lots of colored sputum (mucus)    Severe headache; face, neck, or ear pain    Difficulty swallowing due to throat pain    Fever of 100.4 F (38 C) or higher, or as directed by your healthcare provider  Call 911  Call 911 if any of these occur:    Chest pain, shortness of breath, wheezing, or difficulty breathing    Coughing up blood    Very severe pain with swallowing, especially if it goes along with a muffled voice   Dylan last reviewed this educational content on 6/1/2018 2000-2021 [employer]. All rights reserved. This information is not intended as a substitute for professional medical care. Always follow your healthcare professional's instructions.

## 2021-07-18 NOTE — PATIENT INSTRUCTIONS
Dear Garcia Sanches,    We are sorry you are not feeling well. Based on the responses you provided, it is recommended that you be seen in-person in urgent care so we can better evaluate your symptoms. Please click here to find the nearest urgent care location to you.   You will not be charged for this Visit. Thank you for trusting us with your care.    Berenice Manning PA-C

## 2021-07-18 NOTE — PROGRESS NOTES
SUBJECTIVE:     Garcia Sanches is a 31 year old male presenting with a chief complaint of cough, mucus, sore throat 6 days ago.  Became more congested.  Feels feverish achy, off.      COVID19 vaccinated over 6 weeks ago.      Headache is resolved, was throbbing yesterday.  No stroke symptoms.  Improved with tylenol.  Pain is 2/10 now.             No past medical history on file.  Current Outpatient Medications   Medication Sig Dispense Refill     EPINEPHrine (EPIPEN/ADRENACLICK/AUVI-Q) 0.3 mg/0.3 mL injection [EPINEPHRINE (EPIPEN/ADRENACLICK/AUVI-Q) 0.3 MG/0.3 ML INJECTION] Inject 0.3 mL (0.3 mg total) as directed as needed for anaphylaxis. Inject into thigh. 1 0     Social History     Tobacco Use     Smoking status: Never Smoker     Smokeless tobacco: Never Used   Substance Use Topics     Alcohol use: Yes     Alcohol/week: 1.0 standard drinks       ROS:  ROS negative except as listed above      OBJECTIVE:  /72   Pulse 100   Temp 98  F (36.7  C) (Tympanic)   SpO2 97%   GENERAL APPEARANCE: healthy, alert and no distress  EYES:  conjunctiva clear  HENT: ear canals and TM's normal.  Nose and mouth without ulcers, erythema or lesions  NECK: supple, nontender, no lymphadenopathy  RESP: lungs clear to auscultation - no rales, rhonchi or wheezes  CV: regular rates and rhythm, normal S1 S2, no murmur noted  NEURO: Normal strength and tone, sensory exam grossly normal,  normal speech and mentation  SKIN: no suspicious lesions or rashes      Results for orders placed or performed in visit on 07/18/21   SARS-COV2 (COVID-19) Virus RT-PCR     Status: Normal    Specimen: Nasopharyngeal; Swab   Result Value Ref Range    SARS CoV2 PCR Negative Negative    Narrative    Testing was performed using the Xpert Xpress SARS-CoV-2 Assay on the  Cepheid Gene-Xpert Instrument Systems. Additional information about  this Emergency Use Authorization (EUA) assay can be found via the Lab  Guide. This test should be ordered for the  detection of SARS-CoV-2 in  individuals who meet SARS-CoV-2 clinical and/or epidemiological  criteria. Test performance is unknown in asymptomatic patients. This  test is for in vitro diagnostic use under the FDA EUA for  laboratories certified under CLIA to perform high complexity testing.  This test has not been FDA cleared or approved. A negative result  does not rule out the presence of PCR inhibitors in the specimen or  target RNA in concentration below the limit of detection for the  assay. The possibility of a false negative should be considered if  the patient's recent exposure or clinical presentation suggests  COVID-19. This test was validated by the [facility]. This laboratory is certified under  the Clinical Laboratory Improvement Amendments of 1988 (CLIA-88) as  qualified to perform high complexity laboratory testing.     Streptococcus A Rapid Screen w/Reflex to PCR     Status: Normal    Specimen: Throat; Swab   Result Value Ref Range    Group A Strep antigen Negative Negative   Symptomatic COVID-19 Virus (Coronavirus) by PCR Nasopharyngeal     Status: Normal    Specimen: Nasopharyngeal; Swab    Narrative    The following orders were created for panel order Symptomatic COVID-19 Virus (Coronavirus) by PCR Nasopharyngeal.  Procedure                               Abnormality         Status                     ---------                               -----------         ------                     SARS-COV2 (COVID-19) Vir...[[id number]]  Normal              Final result                 Please view results for these tests on the individual orders.       ASSESSMENT:  (J06.9) Upper respiratory tract infection, unspecified type  (primary encounter diagnosis)  (Z20.822) Person under investigation for COVID-19  (J02.9) Sore throat  Plan: Streptococcus A Rapid Screen w/Reflex to PCR,         Symptomatic COVID-19 Virus (Coronavirus) by PCR        Nasopharyngeal, Group  A Streptococcus PCR         Throat Swab      Patient Instructions     Follow up immediately with severe headache, chest pain, or shortness of breath    Rest, isolate for 10 days, hydrate, follow up if worsening or new symptoms  Household members to isolate until test results, if positive isolate for 2 weeks and follow up for testing if symptoms occur         Patient Education     Coronavirus Disease 2019 (COVID-19): Caring for Yourself or Others   If you or a household member have symptoms of COVID-19, follow the guidelines below. This will help you manage symptoms and keep the virus from spreading.  If you have symptoms of COVID-19    Stay home and contact your care team. They will tell you what to do.    Don t panic. Keep in mind that other illnesses can cause similar symptoms.    Stay away from work, school, and public places.    Limit physical contact with others in your home. Limit visitors. No kissing.  Clean surfaces you touch with disinfectant.  If you need to cough or sneeze, do it into a tissue. Then throw the tissue into the trash. If you don't have tissues, cough or sneeze into the bend of your elbow.  Don t share food or personal items with people in your household. This includes items like eating and drinking utensils, towels, and bedding.  Wear a cloth face mask around other people. During a public health emergency, medical face masks may be reserved for healthcare workers. You may need to make a cloth face mask of your own. You can do this using a bandana, T-shirt, or other cloth. The CDC has instructions on how to make a face mask. Wear the mask so that it covers both your nose and mouth.  If you need to go to a hospital or clinic, call ahead to let them know. Expect the care team to wear masks, gowns, gloves, and eye protection. You may need to come to a different entrance or wait in a separate room.  Follow all instructions from your care team.    If you ve been diagnosed with COVID-19    Stay  home and away from others, including other people in your home. (This is called self-isolation.) Don t leave home unless you need to get medical care. Don t go to work, school, or public places. Don t use buses, taxis, or other public transportation.    Follow all instructions from your care team.    If you need to go to a hospital or clinic, call ahead to let them know. Expect the care team to wear masks, gowns, gloves, and eye protection. You may need to come to a different entrance or wait in a separate room.    Wear a face mask over your nose and mouth. This is to protect others from your germs. If you can t wear a mask, your caregivers should wear one. You may need to make your own mask using a bandana, T-shirt, or other cloth. See the CDC s instructions on how to make a face mask.    Have no contact with pets and other animals.    Don t share food or personal items with people in your household. This includes items like eating and drinking utensils, towels, and bedding.    If you need to cough or sneeze, do it into a tissue. Then throw the tissue into the trash. If you don't have tissues, cough or sneeze into the bend of your elbow.    Wash your hands often.    Self-care at home   At this time, there is no medicine approved to prevent or treat COVID-19. The FDA has approved an antiviral medicine (called remdesivir) for people being treated in the hospital. This is for people 12 years and older who weigh more than about 88 pounds (40 kgs). In certain cases, it may also be used for people younger than 12 years or who weigh less than about 88 pounds (40 kgs)..  Currently, treatment is mostly aimed at helping your body while it fights the virus.    Getting extra rest.    Drink extra fluids 6 to 8 glasses of liquids each day), unless a doctor has told you not to. Ask your care team which fluids are best for you. Avoid fluids that contain caffeine or alcohol.    Taking over-the-counter (OTC) medicine to reduce pain  and fever. Your care team will tell you which medicine to use.  If you ve been in the hospital for COVID-19, follow your care team s instructions. They will tell you when to stop self-isolation. They may also have you change positions to help your breathing (such as lying on your belly).  If a test showed that you have COVID-19, you may be asked to donate plasma after you ve recovered. (This is called COVID-19 convalescent plasma donation.) The plasma may contain antibodies to help fight the virus in others. Experts don't know if the plasma will work well as a treatment. Research continues, and the FDA has approved it for emergency use in certain people with serious or life-threatening COVID-19. For more information, talk to your care team.  Caring for a sick person     Follow all instructions from the care team.    Wash your hands often.    Wear protective clothing as advised.    Make sure the sick person wears a mask. If they can't wear a mask, don t stay in the same room with them. If you must be in the same room, wear a face mask. Make sure the mask covers both the nose and mouth.    Keep track of the sick person s symptoms.    Clean surfaces often with disinfectant. This includes phones, kitchen counters, fridge door handles, bathroom surfaces, and others.    Don t let anyone share household items with the sick person. This includes eating and drinking tools, towels, sheets, or blankets.    Clean fabrics and laundry well.    Keep other people and pets away from the sick person.    When you can stop self-isolation  When you are sick with COVID-19, you should stay away from other people. This is called self-isolation. The rules for ending self-isolation depend on your health in general.  If you are normally healthy:  You can stop self-isolation when all 3 of these are true:    You ve had no fever--and no medicine that reduces fever--for at least 24 hours. And     Your symptoms are better, such as cough or trouble  breathing. And     At least 10 days have passed since your symptoms first started.  Talk with your care team before you leave home. They may tell you it s okay to leave, or they may give you different advice. You do not need to be re-tested.  If you have a weak immune system, or you ve had severe COVID-19:  Follow your care team s instructions. You may be asked to self-isolate for 10 days to 20 days after your symptoms first started. Your care team may want to re-test you for COVID-19.  Note: If you re being treated for cancer, have an immune disorder (such as HIV), or have had a transplant (organ or bone marrow), you may have a weak immune system.  If you've already had COVID-19 once:  If you had the virus over 3 months ago, and you ve been exposed again, treat it like you've never had COVID-19. Stay home, limit your contact with others, call your care team, and watch for symptoms.  If it s been less than 3 months since you had the virus, you re symptom-free, and you ve been exposed again: You don t need to self-isolate or be re-tested. But if you develop new symptoms that can t be linked to another illness, please self-isolate and contact your care team.  When you return to public settings  When you are well enough to go outside your home, follow the CDC s guidance on cloth face masks.    Anyone over age 2 should wear a face mask in public, especially when it's hard to socially distance. This includes public transit, public protests and marches, and crowded stores, bars, and restaurants.    Face masks are most likely to reduce the spread of COVID-19 when they are widely used by people who are out in the public.  Certain people should not wear a face covering. These include:    Children under 2 years old    Anyone with a health, developmental, or mental health condition that can be made worse by wearing a mask    Anyone who is unconscious or unable to remove the face covering without help. See the CDC's guidance on  who should not wear a face mask.  When to call your care team  Call your care team right away if a sick person has any of these:    Trouble breathing    Pain or pressure in chest  If a sick person has any of these, call 911:    Trouble breathing that gets worse    Pain or pressure in chest that gets worse    Blue tint to lips or face    Fast or irregular heartbeat    Confusion or trouble waking    Fainting or loss of consciousness    Coughing up blood  Going home from the hospital   If you have COVID-19 and were recently in the hospital:    Follow the instructions above for self-care and isolation.    Follow the hospital care team s instructions.    Ask questions if anything is unclear to you. Write down answers so you remember them.  Date last modified: 11/23/2020  [employer] last reviewed this educational content on 4/1/2020  This information has been modified by your health care provider with permission from the publisher.    4813-8206 [employer]. [address] [place] [address]. All rights reserved. This information is not intended as a substitute for professional medical care. Always follow your healthcare professional's instructions.             Patient Education     Self-Care for Sore Throats     Sore throats happen for many reasons, such as colds, allergies, cigarette smoke, air pollution, and infections caused by viruses or bacteria. In any case, your throat becomes red and sore. Your goal for self-care is to reduce your discomfort while giving your throat a chance to heal.  Moisten and soothe your throat  Tips include the following:    Try a sip of water first thing after waking up.    Keep your throat moist by drinking 6 or more glasses of clear liquids every day.    Run a cool-air humidifier in your room overnight.    Stay away from cigarette smoke.     Check the air quality index,if air pollution gives you a sore throat. On high pollution days, try to limit outdoor time.    Suck  on throat lozenges, cough drops, hard candy, ice chips, or frozen fruit-juice bars. Use the sugar-free versions if your diet or medical condition requires them.  Gargle to ease irritation  Gargling every hour or 2 can ease irritation. Try gargling with 1 of these solutions:    1/4 teaspoon of salt in 1/2 cup of warm water    An over-the-counter anesthetic gargle  Use medicine for more relief  Over-the-counter medicine can reduce sore throat symptoms. Ask your pharmacist if you have questions about which medicine to use. To prevent possible medicine interactions, let the pharmacist know what medicines you take. To decrease symptoms:    Ease pain with anesthetic sprays. Aspirin or an aspirin substitute also helps. Remember, never give aspirin to anyone 18 or younger. Don't take aspirin if you are already taking blood thinners.     For sore throats caused by allergies, try antihistamines to block the allergic reaction.  Unless a sore throat is caused by a bacterial infection, antibiotics won t help you.  Prevent future sore throats  Prevention tips include:    Stop smoking or reduce contact with secondhand smoke. Smoke irritates the tender throat lining.    Limit contact with pets and with allergy-causing substances, such as pollen and mold.    Wash your hands often when you re around someone with a sore throat or cold. This will keep viruses or bacteria from spreading.    Limit outdoor time when air pollution is bad.    Don t strain your vocal cords.  When to call your healthcare provider  Contact your healthcare provider if you have:    Fever of 100.4 F (38.0 C) or higher, or as directed by your healthcare provider    White spots on the throat    Great Trouble swallowing    A skin rash    Recent exposure to someone else with strep bacteria    Severe hoarseness and swollen glands in the neck or jaw  Call 911  Call 911 if any of the following occur:    Trouble breathing or catching your breath    Drooling and problems  swallowing    Wheezing    Unable to talk    Feeling dizzy or faint    Feeling of doom  [employer] last reviewed this educational content on 9/1/2019 2000-2021 [employer]. All rights reserved. This information is not intended as a substitute for professional medical care. Always follow your healthcare professional's instructions.           Patient Education     Viral Upper Respiratory Illness (Adult)    You have a viral upper respiratory illness (URI), which is another term for the common cold. This illness is contagious during the first few days. It is spread through the air by coughing and sneezing. It may also be spread by direct contact (touching the sick person and then touching your own eyes, nose, or mouth). Frequent handwashing will decrease risk of spread. Most viral illnesses go away within 7 to 10 days with rest and simple home remedies. Sometimes the illness may last for several weeks. Antibiotics will not kill a virus, and they are generally not prescribed for this condition.  Home care    If symptoms are severe, rest at home for the first 2 to 3 days. When you resume activity, don't let yourself get too tired.    Don't smoke. If you need help stopping, talk with your healthcare provider.    Avoid being exposed to cigarette smoke (yours or others ).    You may use acetaminophen or ibuprofen to control pain and fever, unless another medicine was prescribed. If you have chronic liver or kidney disease, have ever had a stomach ulcer or gastrointestinal bleeding, or are taking blood-thinning medicines, talk with your healthcare provider before using these medicines. Aspirin should never be given to anyone under 18 years of age who is ill with a viral infection or fever. It may cause severe liver or brain damage.    Your appetite may be poor, so a light diet is fine. Stay well hydrated by drinking 6 to 8 glasses of fluids per day (water, soft drinks, juices, tea, or soup). Extra fluids will  help loosen secretions in the nose and lungs.    Over-the-counter cold medicines will not shorten the length of time you re sick, but they may be helpful for the following symptoms: cough, sore throat, and nasal and sinus congestion. If you take prescription medicines, ask your healthcare provider or pharmacist which over-the-counter medicines are safe to use. (Note: Don't use decongestants if you have high blood pressure.)  Follow-up care  Follow up with your healthcare provider, or as advised.  When to seek medical advice  Call your healthcare provider right away if any of these occur:    Cough with lots of colored sputum (mucus)    Severe headache; face, neck, or ear pain    Difficulty swallowing due to throat pain    Fever of 100.4 F (38 C) or higher, or as directed by your healthcare provider  Call 911  Call 911 if any of these occur:    Chest pain, shortness of breath, wheezing, or difficulty breathing    Coughing up blood    Very severe pain with swallowing, especially if it goes along with a muffled voice   [employer] last reviewed this educational content on 6/1/2018 2000-2021 [employer]. All rights reserved. This information is not intended as a substitute for professional medical care. Always follow your healthcare professional's instructions.

## 2021-07-19 LAB — GROUP A STREP BY PCR: NOT DETECTED

## 2021-10-23 ENCOUNTER — HEALTH MAINTENANCE LETTER (OUTPATIENT)
Age: 31
End: 2021-10-23

## 2022-04-09 ENCOUNTER — HEALTH MAINTENANCE LETTER (OUTPATIENT)
Age: 32
End: 2022-04-09

## 2022-10-10 ENCOUNTER — HEALTH MAINTENANCE LETTER (OUTPATIENT)
Age: 32
End: 2022-10-10

## 2023-02-14 ENCOUNTER — OFFICE VISIT (OUTPATIENT)
Dept: FAMILY MEDICINE | Facility: CLINIC | Age: 33
End: 2023-02-14
Payer: COMMERCIAL

## 2023-02-14 VITALS
RESPIRATION RATE: 14 BRPM | DIASTOLIC BLOOD PRESSURE: 85 MMHG | SYSTOLIC BLOOD PRESSURE: 156 MMHG | HEART RATE: 56 BPM | BODY MASS INDEX: 21.31 KG/M2 | WEIGHT: 161.5 LBS | TEMPERATURE: 98.1 F | OXYGEN SATURATION: 98 %

## 2023-02-14 DIAGNOSIS — H66.003 NON-RECURRENT ACUTE SUPPURATIVE OTITIS MEDIA OF BOTH EARS WITHOUT SPONTANEOUS RUPTURE OF TYMPANIC MEMBRANES: Primary | ICD-10-CM

## 2023-02-14 PROCEDURE — 99213 OFFICE O/P EST LOW 20 MIN: CPT | Performed by: PHYSICIAN ASSISTANT

## 2023-02-14 NOTE — PROGRESS NOTES
Patient presents with:  URI: Cold sx for 5 days, pain in both ear for 3 days      Clinical Decision Making:  Patient is treated with Augmentin for bilateral otitis media. Expected course of resolution and indication for return was gone over and questions were answered to patient/parent's satisfaction before discharge.            ICD-10-CM    1. Non-recurrent acute suppurative otitis media of both ears without spontaneous rupture of tympanic membranes  H66.003 amoxicillin-clavulanate (AUGMENTIN) 875-125 MG tablet          Patient Instructions   Take antibiotic as written  Use of over-the-counter Tylenol for comfort.  Follow packaging directions  Return to see your primary care provider if not getting good resolution or if new symptoms or concerns arise.              HPI:  Garcia Sanches is a 32 year old male who presents today for evaluation of bilateral ear pain.  Patient has had a prior history of otitis media and myringotomy tubes placed as a child.  In the week and a half prior to onset of his symptoms he has had cold runny nose and congestion.  The cold-like symptoms have resolved.  He is now developing right-sided ear pain.  Decreased hearing but no otorrhea or balance deficits to report.    History obtained from chart review and the patient.    Problem List:  2009-03: Shoulder instability  2009-03: Shoulder pain      History reviewed. No pertinent past medical history.    Social History     Tobacco Use     Smoking status: Never     Smokeless tobacco: Never   Substance Use Topics     Alcohol use: Yes     Alcohol/week: 1.0 standard drink       Review of Systems  As above in HPI otherwise negative.    Vitals:    02/14/23 1318   BP: (!) 156/85   Pulse: 56   Resp: 14   Temp: 98.1  F (36.7  C)   TempSrc: Oral   SpO2: 98%   Weight: 73.3 kg (161 lb 8 oz)       General: Patient is resting comfortably no acute distress is afebrile  HEENT: Head is normocephalic atraumatic   eyes are PERRL EOMI sclera anicteric   TMs are  with fluid in the middle ear on the left ear and dull cone of light reflex.  TM on the right is erythematous but without effusion.  Throat is clear and no exudate  No cervical lymphadenopathy present  LUNGS: Clear to auscultation bilaterally  HEART: Regular rate and rhythm  Skin: Without rash non-diaphoretic    Physical Exam    At the end of the encounter, I discussed results, diagnosis, medications. Discussed red flags for immediate return to clinic/ER, as well as indications for follow up if no improvement. Patient understood and agreed to plan. Patient was stable for discharge.

## 2023-02-14 NOTE — PATIENT INSTRUCTIONS
Take antibiotic as written  Use of over-the-counter Tylenol for comfort.  Follow packaging directions  Return to see your primary care provider if not getting good resolution or if new symptoms or concerns arise.

## 2023-05-27 ENCOUNTER — HEALTH MAINTENANCE LETTER (OUTPATIENT)
Age: 33
End: 2023-05-27

## 2024-03-27 ENCOUNTER — OFFICE VISIT (OUTPATIENT)
Dept: FAMILY MEDICINE | Facility: CLINIC | Age: 34
End: 2024-03-27
Payer: COMMERCIAL

## 2024-03-27 VITALS
HEART RATE: 95 BPM | BODY MASS INDEX: 21.99 KG/M2 | SYSTOLIC BLOOD PRESSURE: 130 MMHG | TEMPERATURE: 98 F | WEIGHT: 165.9 LBS | OXYGEN SATURATION: 99 % | RESPIRATION RATE: 16 BRPM | HEIGHT: 73 IN | DIASTOLIC BLOOD PRESSURE: 86 MMHG

## 2024-03-27 DIAGNOSIS — D22.9 NUMEROUS MOLES: ICD-10-CM

## 2024-03-27 DIAGNOSIS — Z13.220 LIPID SCREENING: ICD-10-CM

## 2024-03-27 DIAGNOSIS — Z91.030 BEE STING ALLERGY: ICD-10-CM

## 2024-03-27 DIAGNOSIS — Z00.00 ROUTINE GENERAL MEDICAL EXAMINATION AT A HEALTH CARE FACILITY: Primary | ICD-10-CM

## 2024-03-27 DIAGNOSIS — Z13.0 SCREENING FOR DISORDER OF BLOOD AND BLOOD-FORMING ORGANS: ICD-10-CM

## 2024-03-27 DIAGNOSIS — Q30.9 NOSE ABNORMALITY: ICD-10-CM

## 2024-03-27 LAB
ERYTHROCYTE [DISTWIDTH] IN BLOOD BY AUTOMATED COUNT: 11.8 % (ref 10–15)
HCT VFR BLD AUTO: 44.4 % (ref 40–53)
HGB BLD-MCNC: 15.2 G/DL (ref 13.3–17.7)
MCH RBC QN AUTO: 29.3 PG (ref 26.5–33)
MCHC RBC AUTO-ENTMCNC: 34.2 G/DL (ref 31.5–36.5)
MCV RBC AUTO: 86 FL (ref 78–100)
PLATELET # BLD AUTO: 257 10E3/UL (ref 150–450)
RBC # BLD AUTO: 5.18 10E6/UL (ref 4.4–5.9)
WBC # BLD AUTO: 6.5 10E3/UL (ref 4–11)

## 2024-03-27 PROCEDURE — 80048 BASIC METABOLIC PNL TOTAL CA: CPT | Performed by: NURSE PRACTITIONER

## 2024-03-27 PROCEDURE — 36415 COLL VENOUS BLD VENIPUNCTURE: CPT | Performed by: NURSE PRACTITIONER

## 2024-03-27 PROCEDURE — 80061 LIPID PANEL: CPT | Performed by: NURSE PRACTITIONER

## 2024-03-27 PROCEDURE — 85027 COMPLETE CBC AUTOMATED: CPT | Performed by: NURSE PRACTITIONER

## 2024-03-27 PROCEDURE — 99395 PREV VISIT EST AGE 18-39: CPT | Performed by: NURSE PRACTITIONER

## 2024-03-27 PROCEDURE — 99213 OFFICE O/P EST LOW 20 MIN: CPT | Mod: 25 | Performed by: NURSE PRACTITIONER

## 2024-03-27 RX ORDER — EPINEPHRINE 0.3 MG/.3ML
0.3 INJECTION SUBCUTANEOUS PRN
Qty: 2 EACH | Refills: 3 | Status: SHIPPED | OUTPATIENT
Start: 2024-03-27

## 2024-03-27 SDOH — HEALTH STABILITY: PHYSICAL HEALTH: ON AVERAGE, HOW MANY DAYS PER WEEK DO YOU ENGAGE IN MODERATE TO STRENUOUS EXERCISE (LIKE A BRISK WALK)?: 1 DAY

## 2024-03-27 SDOH — HEALTH STABILITY: PHYSICAL HEALTH: ON AVERAGE, HOW MANY MINUTES DO YOU ENGAGE IN EXERCISE AT THIS LEVEL?: 10 MIN

## 2024-03-27 ASSESSMENT — ANXIETY QUESTIONNAIRES
3. WORRYING TOO MUCH ABOUT DIFFERENT THINGS: NOT AT ALL
5. BEING SO RESTLESS THAT IT IS HARD TO SIT STILL: NOT AT ALL
7. FEELING AFRAID AS IF SOMETHING AWFUL MIGHT HAPPEN: NOT AT ALL
IF YOU CHECKED OFF ANY PROBLEMS ON THIS QUESTIONNAIRE, HOW DIFFICULT HAVE THESE PROBLEMS MADE IT FOR YOU TO DO YOUR WORK, TAKE CARE OF THINGS AT HOME, OR GET ALONG WITH OTHER PEOPLE: SOMEWHAT DIFFICULT
4. TROUBLE RELAXING: NOT AT ALL
8. IF YOU CHECKED OFF ANY PROBLEMS, HOW DIFFICULT HAVE THESE MADE IT FOR YOU TO DO YOUR WORK, TAKE CARE OF THINGS AT HOME, OR GET ALONG WITH OTHER PEOPLE?: SOMEWHAT DIFFICULT
7. FEELING AFRAID AS IF SOMETHING AWFUL MIGHT HAPPEN: NOT AT ALL
GAD7 TOTAL SCORE: 1
6. BECOMING EASILY ANNOYED OR IRRITABLE: SEVERAL DAYS
GAD7 TOTAL SCORE: 1
1. FEELING NERVOUS, ANXIOUS, OR ON EDGE: NOT AT ALL
2. NOT BEING ABLE TO STOP OR CONTROL WORRYING: NOT AT ALL
GAD7 TOTAL SCORE: 1

## 2024-03-27 ASSESSMENT — SOCIAL DETERMINANTS OF HEALTH (SDOH): HOW OFTEN DO YOU GET TOGETHER WITH FRIENDS OR RELATIVES?: TWICE A WEEK

## 2024-03-27 ASSESSMENT — PATIENT HEALTH QUESTIONNAIRE - PHQ9
SUM OF ALL RESPONSES TO PHQ QUESTIONS 1-9: 3
10. IF YOU CHECKED OFF ANY PROBLEMS, HOW DIFFICULT HAVE THESE PROBLEMS MADE IT FOR YOU TO DO YOUR WORK, TAKE CARE OF THINGS AT HOME, OR GET ALONG WITH OTHER PEOPLE: NOT DIFFICULT AT ALL
SUM OF ALL RESPONSES TO PHQ QUESTIONS 1-9: 3

## 2024-03-27 ASSESSMENT — PAIN SCALES - GENERAL: PAINLEVEL: NO PAIN (0)

## 2024-03-27 NOTE — PATIENT INSTRUCTIONS
Preventive Care Advice   This is general advice given by our system to help you stay healthy. However, your care team may have specific advice just for you. Please talk to your care team about your preventive care needs.  Nutrition  Eat 5 or more servings of fruits and vegetables each day.  Try wheat bread, brown rice and whole grain pasta (instead of white bread, rice, and pasta).  Get enough calcium and vitamin D. Check the label on foods and aim for 100% of the RDA (recommended daily allowance).  Lifestyle  Exercise at least 150 minutes each week   (30 minutes a day, 5 days a week).  Do muscle strengthening activities 2 days a week. These help control your weight and prevent disease.  No smoking.  Wear sunscreen to prevent skin cancer.  Have a dental exam and cleaning every 6 months.  Yearly exams  See your health care team every year to talk about:  Any changes in your health.  Any medicines your care team has prescribed.  Preventive care, family planning, and ways to prevent chronic diseases.  Shots (vaccines)   HPV shots (up to age 26), if you've never had them before.  Hepatitis B shots (up to age 59), if you've never had them before.  COVID-19 shot: Get this shot when it's due.  Flu shot: Get a flu shot every year.  Tetanus shot: Get a tetanus shot every 10 years.  Pneumococcal, hepatitis A, and RSV shots: Ask your care team if you need these based on your risk.  Shingles shot (for age 50 and up).  General health tests  Diabetes screening:  Starting at age 35, Get screened for diabetes at least every 3 years.  If you are younger than age 35, ask your care team if you should be screened for diabetes.  Cholesterol test: At age 39, start having a cholesterol test every 5 years, or more often if advised.  Bone density scan (DEXA): At age 50, ask your care team if you should have this scan for osteoporosis (brittle bones).  Hepatitis C: Get tested at least once in your life.  STIs (sexually transmitted  infections)  Before age 24: Ask your care team if you should be screened for STIs.  After age 24: Get screened for STIs if you're at risk. You are at risk for STIs (including HIV) if:  You are sexually active with more than one person.  You don't use condoms every time.  You or a partner was diagnosed with a sexually transmitted infection.  If you are at risk for HIV, ask about PrEP medicine to prevent HIV.  Get tested for HIV at least once in your life, whether you are at risk for HIV or not.  Cancer screening tests  Cervical cancer screening: If you have a cervix, begin getting regular cervical cancer screening tests at age 21. Most people who have regular screenings with normal results can stop after age 65. Talk about this with your provider.  Breast cancer scan (mammogram): If you've ever had breasts, begin having regular mammograms starting at age 40. This is a scan to check for breast cancer.  Colon cancer screening: It is important to start screening for colon cancer at age 45.  Have a colonoscopy test every 10 years (or more often if you're at risk) Or, ask your provider about stool tests like a FIT test every year or Cologuard test every 3 years.  To learn more about your testing options, visit: https://www.[employer]/859422.pdf.  For help making a decision, visit: https://bit.ly/wm49503.  Prostate cancer screening test: If you have a prostate and are age 55 to 69, ask your provider if you would benefit from a yearly prostate cancer screening test.  Lung cancer screening: If you are a current or former smoker age 50 to 80, ask your care team if ongoing lung cancer screenings are right for you.  For informational purposes only. Not to replace the advice of your health care provider. Copyright   2023 [employer]. All rights reserved. Clinically reviewed by the [facility] Transitions Program. [employer] 999393 - REV 01/24.    Learning About Stress  What is stress?     Stress is your  body's response to a hard situation. Your body can have a physical, emotional, or mental response. Stress is a fact of life for most people, and it affects everyone differently. What causes stress for you may not be stressful for someone else.  A lot of things can cause stress. You may feel stress when you go on a job interview, take a test, or run a race. This kind of short-term stress is normal and even useful. It can help you if you need to work hard or react quickly. For example, stress can help you finish an important job on time.  Long-term stress is caused by ongoing stressful situations or events. Examples of long-term stress include long-term health problems, ongoing problems at work, or conflicts in your family. Long-term stress can harm your health.  How does stress affect your health?  When you are stressed, your body responds as though you are in danger. It makes hormones that speed up your heart, make you breathe faster, and give you a burst of energy. This is called the fight-or-flight stress response. If the stress is over quickly, your body goes back to normal and no harm is done.  But if stress happens too often or lasts too long, it can have bad effects. Long-term stress can make you more likely to get sick, and it can make symptoms of some diseases worse. If you tense up when you are stressed, you may develop neck, shoulder, or low back pain. Stress is linked to high blood pressure and heart disease.  Stress also harms your emotional health. It can make you price, tense, or depressed. Your relationships may suffer, and you may not do well at work or school.  What can you do to manage stress?  You can try these things to help manage stress:   Do something active. Exercise or activity can help reduce stress. Walking is a great way to get started. Even everyday activities such as housecleaning or yard work can help.  Try yoga or tonie chi. These techniques combine exercise and meditation. You may need  some training at first to learn them.  Do something you enjoy. For example, listen to music or go to a movie. Practice your hobby or do volunteer work.  Meditate. This can help you relax, because you are not worrying about what happened before or what may happen in the future.  Do guided imagery. Imagine yourself in any setting that helps you feel calm. You can use online videos, books, or a teacher to guide you.  Do breathing exercises. For example:  From a standing position, bend forward from the waist with your knees slightly bent. Let your arms dangle close to the floor.  Breathe in slowly and deeply as you return to a standing position. Roll up slowly and lift your head last.  Hold your breath for just a few seconds in the standing position.  Breathe out slowly and bend forward from the waist.  Let your feelings out. Talk, laugh, cry, and express anger when you need to. Talking with supportive friends or family, a counselor, or a jak leader about your feelings is a healthy way to relieve stress. Avoid discussing your feelings with people who make you feel worse.  Write. It may help to write about things that are bothering you. This helps you find out how much stress you feel and what is causing it. When you know this, you can find better ways to cope.  What can you do to prevent stress?  You might try some of these things to help prevent stress:  Manage your time. This helps you find time to do the things you want and need to do.  Get enough sleep. Your body recovers from the stresses of the day while you are sleeping.  Get support. Your family, friends, and community can make a difference in how you experience stress.  Limit your news feed. Avoid or limit time on social media or news that may make you feel stressed.  Do something active. Exercise or activity can help reduce stress. Walking is a great way to get started.  Where can you learn more?  Go to https://www.healthwise.net/patiented  Enter N032 in the  "search box to learn more about \"Learning About Stress.\"  Current as of: October 24, 2023               Content Version: 14.0    0380-6023 [employer].   Care instructions adapted under license by your healthcare professional. If you have questions about a medical condition or this instruction, always ask your healthcare professional. [employer] disclaims any warranty or liability for your use of this information.      "

## 2024-03-27 NOTE — PROGRESS NOTES
Preventive Care Visit  [facility]  Radha Vigil CNP, Nurse Practitioner Primary Care  Mar 27, 2024        Jose Zelaya is a 33 year old, presenting for the following:  Physical (Pt is not fasting. Moles on back-recheck, tender with clothing, abnormal edge. Bump on nose has some changes. )        3/27/2024    10:51 AM   Additional Questions   Roomed by Nereida GROSS LPN        Health Care Directive  Patient does not have a Health Care Directive or Living Will: Discussed advance care planning with patient; however, patient declined at this time.    HPI  Lump on right side of nose for about a year- gradually increasing size- not painful- little harder to breathe out of right nostril.  Moles on back feel irritated at times. Bee sting allergy- needs EpiPen refills.         3/27/2024   General Health   How would you rate your overall physical health? (!) FAIR   Feel stress (tense, anxious, or unable to sleep) Only a little   (!) STRESS CONCERN      3/27/2024   Nutrition   Three or more servings of calcium each day? (!) NO   Diet: Regular (no restrictions)   How many servings of fruit and vegetables per day? (!) 0-1   How many sweetened beverages each day? (!) 2         3/27/2024   Exercise   Days per week of moderate/strenous exercise 1 day   Average minutes spent exercising at this level 10 min   (!) EXERCISE CONCERN      3/27/2024   Social Factors   Frequency of gathering with friends or relatives Twice a week   Worry food won't last until get money to buy more No   Food not last or not have enough money for food? No   Do you have housing?  Yes   Are you worried about losing your housing? No   Lack of transportation? No   Unable to get utilities (heat,electricity)? No         3/27/2024   Dental   Dentist two times every year? Yes         3/27/2024   TB Screening   Were you born outside of the [place]? No       Today's PHQ-9 Score:       3/27/2024     9:36 AM   PHQ-9 SCORE   PHQ-9 Total Score  "MyChart 3 (Minimal depression)   PHQ-9 Total Score 3         3/27/2024   Substance Use   Alcohol more than 3/day or more than 7/wk No   Do you use any other substances recreationally? No     Social History     Tobacco Use    Smoking status: Never     Passive exposure: Current    Smokeless tobacco: Never   Vaping Use    Vaping Use: Never used   Substance Use Topics    Alcohol use: Yes     Alcohol/week: 1.0 standard drink of alcohol    Drug use: Never           3/27/2024   STI Screening   New sexual partner(s) since last STI/HIV test? No         3/27/2024   Contraception/Family Planning   Questions about contraception or family planning No        Reviewed and updated as needed this visit by Provider                    History reviewed. No pertinent past medical history.  Past Surgical History:   Procedure Laterality Date    WISDOM TOOTH EXTRACTION       Review of Systems  Constitutional, HEENT, cardiovascular, pulmonary, GI, , musculoskeletal, neuro, skin, endocrine and psych systems are negative, except as otherwise noted.     Objective    Exam  /86   Pulse 95   Temp 98  F (36.7  C) (Oral)   Resp 16   Ht 1.854 m (6' 1\")   Wt 75.3 kg (165 lb 14.4 oz)   SpO2 99%   BMI 21.89 kg/m     Estimated body mass index is 21.89 kg/m  as calculated from the following:    Height as of this encounter: 1.854 m (6' 1\").    Weight as of this encounter: 75.3 kg (165 lb 14.4 oz).    Physical Exam  GENERAL: alert and no distress  EYES: Eyes grossly normal to inspection, PERRL and conjunctivae and sclerae normal  HENT: normal cephalic/atraumatic, ear canals and TM's normal, nose and mouth without ulcers or lesions, oropharynx clear, oral mucous membranes moist, and firm lump on right side of nose  NECK: no adenopathy, no asymmetry, masses, or scars  RESP: lungs clear to auscultation - no rales, rhonchi or wheezes  CV: regular rate and rhythm, normal S1 S2, no S3 or S4, no murmur, click or rub, no peripheral edema  ABDOMEN: " soft, nontender, no hepatosplenomegaly, no masses and bowel sounds normal   (male): normal male genitalia without lesions or urethral discharge, no hernia  MS: no gross musculoskeletal defects noted, no edema  SKIN: multiple moles, some are mildly irregular   NEURO: Normal strength and tone, mentation intact and speech normal  PSYCH: mentation appears normal, affect normal/bright    A/P:  1. Routine general medical examination at a health care facility  Overall good health    2. Bee sting allergy  - EPINEPHrine (ANY BX GENERIC EQUIV) 0.3 MG/0.3ML injection 2-pack; Inject 0.3 mLs (0.3 mg) into the muscle as needed for other (bee sting)  Dispense: 2 each; Refill: 3    3. Lipid screening  - Lipid panel reflex to direct LDL Non-fasting; Future  - Lipid panel reflex to direct LDL Non-fasting    4. Screening for disorder of blood and blood-forming organs  - Basic metabolic panel; Future  - CBC with platelets; Future  - Basic metabolic panel  - CBC with platelets    5. Numerous moles  - Adult Dermatology  Referral; Future    6. Nose abnormality  - Adult ENT  Referral; Future      Signed Electronically by: Radha Vigil CNP    Answers submitted by the patient for this visit:  Patient Health Questionnaire (Submitted on 3/27/2024)  If you checked off any problems, how difficult have these problems made it for you to do your work, take care of things at home, or get along with other people?: Not difficult at all  PHQ9 TOTAL SCORE: 3  RANDOLPH-7 (Submitted on 3/27/2024)  RANDOLPH 7 TOTAL SCORE: 1

## 2024-03-28 LAB
ANION GAP SERPL CALCULATED.3IONS-SCNC: 12 MMOL/L (ref 7–15)
BUN SERPL-MCNC: 14.3 MG/DL (ref 6–20)
CALCIUM SERPL-MCNC: 9.4 MG/DL (ref 8.6–10)
CHLORIDE SERPL-SCNC: 103 MMOL/L (ref 98–107)
CHOLEST SERPL-MCNC: 174 MG/DL
CREAT SERPL-MCNC: 0.8 MG/DL (ref 0.67–1.17)
DEPRECATED HCO3 PLAS-SCNC: 25 MMOL/L (ref 22–29)
EGFRCR SERPLBLD CKD-EPI 2021: >90 ML/MIN/1.73M2
FASTING STATUS PATIENT QL REPORTED: ABNORMAL
GLUCOSE SERPL-MCNC: 99 MG/DL (ref 70–99)
HDLC SERPL-MCNC: 43 MG/DL
LDLC SERPL CALC-MCNC: 111 MG/DL
NONHDLC SERPL-MCNC: 131 MG/DL
POTASSIUM SERPL-SCNC: 4.1 MMOL/L (ref 3.4–5.3)
SODIUM SERPL-SCNC: 140 MMOL/L (ref 135–145)
TRIGL SERPL-MCNC: 102 MG/DL

## 2024-04-22 ENCOUNTER — OFFICE VISIT (OUTPATIENT)
Dept: FAMILY MEDICINE | Facility: CLINIC | Age: 34
End: 2024-04-22
Payer: COMMERCIAL

## 2024-04-22 VITALS
RESPIRATION RATE: 14 BRPM | DIASTOLIC BLOOD PRESSURE: 86 MMHG | TEMPERATURE: 98.1 F | HEART RATE: 81 BPM | SYSTOLIC BLOOD PRESSURE: 128 MMHG | OXYGEN SATURATION: 97 %

## 2024-04-22 DIAGNOSIS — K12.1 STOMATITIS AND MUCOSITIS: Primary | ICD-10-CM

## 2024-04-22 DIAGNOSIS — K12.30 STOMATITIS AND MUCOSITIS: Primary | ICD-10-CM

## 2024-04-22 PROCEDURE — 87529 HSV DNA AMP PROBE: CPT | Performed by: PHYSICIAN ASSISTANT

## 2024-04-22 PROCEDURE — 99213 OFFICE O/P EST LOW 20 MIN: CPT | Performed by: PHYSICIAN ASSISTANT

## 2024-04-22 NOTE — PROGRESS NOTES
Patient presents with:  Mouth Problem: Has sores inside mouth  for 5 days       Clinical Decision Making:  PE consistent with stomatitis. HSV swab collected. Patient started on magic mouthwash.         ICD-10-CM    1. Stomatitis and mucositis  K12.1 Herpes Simplex Virus 1&2 by PCR    K12.30 magic mouthwash suspension (diphenhydrAMINE, lidocaine, aluminum-magnesium & simethicone)     Herpes Simplex Virus 1&2 by PCR          Patient Instructions   - Maintain good hydration  -. Avoid foods that are spicy or acidic, or are rough. Soft foods are best and can be less irritating if mouth is sore.  -No sharing of food or drink, utensils/cups until rash and mouth sores are gone  -Tylenol or Ibuprofen as needed for fever or discomfort  - Use magic mouthwash as needed for pain relief every 6 hours .  -You will be able to see your HSV test results via Mychart in about 24-48 hours.         HPI:  Garcia Sanches is a 33 year old male who presents today with concerns of sores inside mouth x 5 days. This same thing has happened before typically in spring and fall for him.     History obtained from the patient.    Problem List:  2009-03: Shoulder instability  2009-03: Shoulder pain      No past medical history on file.    Social History     Tobacco Use    Smoking status: Never     Passive exposure: Current    Smokeless tobacco: Never   Substance Use Topics    Alcohol use: Yes     Alcohol/week: 1.0 standard drink of alcohol       Review of Systems    Vitals:    04/22/24 1437   BP: 128/86   Pulse: 81   Resp: 14   Temp: 98.1  F (36.7  C)   TempSrc: Oral   SpO2: 97%       Physical Exam  Vitals and nursing note reviewed.   Constitutional:       General: He is not in acute distress.     Appearance: He is not toxic-appearing or diaphoretic.   HENT:      Head: Normocephalic and atraumatic.      Right Ear: External ear normal.      Left Ear: External ear normal.      Mouth/Throat:      Comments: Shallow ulcerated lesions on the inside of the  upper and lower lip and left buccal mucosa. No signs of cold sores on the outside of the lip. No chelation. No signs of crusting concerning for impetigo  Eyes:      Conjunctiva/sclera: Conjunctivae normal.   Pulmonary:      Effort: Pulmonary effort is normal. No respiratory distress.   Neurological:      Mental Status: He is alert.   Psychiatric:         Mood and Affect: Mood normal.         Behavior: Behavior normal.         Thought Content: Thought content normal.         Judgment: Judgment normal.       At the end of the encounter, I discussed results, diagnosis, medications. Discussed red flags for immediate return to clinic/ER, as well as indications for follow up if no improvement. Patient understood and agreed to plan. Patient was stable for discharge.

## 2024-04-22 NOTE — PATIENT INSTRUCTIONS
- Maintain good hydration  -. Avoid foods that are spicy or acidic, or are rough. Soft foods are best and can be less irritating if mouth is sore.  -No sharing of food or drink, utensils/cups until rash and mouth sores are gone  -Tylenol or Ibuprofen as needed for fever or discomfort  - Use magic mouthwash as needed for pain relief every 6 hours .  -You will be able to see your HSV test results via Mychart in about 24-48 hours.

## 2024-04-23 LAB
HSV1 DNA SPEC QL NAA+PROBE: NOT DETECTED
HSV2 DNA SPEC QL NAA+PROBE: NOT DETECTED

## 2024-07-01 ENCOUNTER — OFFICE VISIT (OUTPATIENT)
Dept: OTOLARYNGOLOGY | Facility: CLINIC | Age: 34
End: 2024-07-01
Attending: NURSE PRACTITIONER
Payer: COMMERCIAL

## 2024-07-01 DIAGNOSIS — J34.89 NASAL CRUSTING: Primary | ICD-10-CM

## 2024-07-01 DIAGNOSIS — Q30.9 NOSE ABNORMALITY: ICD-10-CM

## 2024-07-01 DIAGNOSIS — J34.2 DEVIATED NASAL SEPTUM: ICD-10-CM

## 2024-07-01 PROCEDURE — 99203 OFFICE O/P NEW LOW 30 MIN: CPT | Performed by: OTOLARYNGOLOGY

## 2024-07-01 RX ORDER — MUPIROCIN 20 MG/G
OINTMENT TOPICAL
Qty: 22 G | Refills: 0 | Status: SHIPPED | OUTPATIENT
Start: 2024-07-01

## 2024-07-01 RX ORDER — TRIAMCINOLONE ACETONIDE 0.1 %
PASTE (GRAM) DENTAL
COMMUNITY
Start: 2024-04-25

## 2024-07-01 NOTE — LETTER
7/1/2024      Garcia Sanches  [address]  [facility] [address]      Dear Colleague,    Thank you for referring your patient, Garcia Sanches, to the [facility]. Please see a copy of my visit note below.    CHIEF COMPLAINT: Patient presents with:  Ent Problem: Lump on right side of nose for about a year- gradually increasing size- not painful- little harder to breathe out of right nostril. No imaging done.          HISTORY OF PRESENT ILLNESS    Garcia was seen at the behest of Radha Vigil CNP for nose concern.  He is scheduled to Dermatology in October of this year.      REFERRAL NOTE:    HENT: normal cephalic/atraumatic, ear canals and TM's normal, nose and mouth without ulcers or lesions, oropharynx clear, oral mucous membranes moist, and firm lump on right side of nose      REVIEW OF SYSTEMS    Review of Systems as per HPI and PMHx, otherwise 10 system review system are negative.       ALLERGIES    Bee venom protein (honey bee) [bee venom]    CURRENT MEDICATIONS      Current Outpatient Medications:      EPINEPHrine (ANY BX GENERIC EQUIV) 0.3 MG/0.3ML injection 2-pack, Inject 0.3 mLs (0.3 mg) into the muscle as needed for other (bee sting), Disp: 2 each, Rfl: 3     magic mouthwash suspension (diphenhydrAMINE, lidocaine, aluminum-magnesium & simethicone), Swish and spit 10 mLs in mouth every 6 hours as needed for mouth sores, Disp: 300 mL, Rfl: 0     mupirocin (BACTROBAN) 2 % external ointment, Apply a pea sized amount to inside of each nostril 3x daily with Qtip for 10 days, Disp: 22 g, Rfl: 0     triamcinolone (KENALOG) 0.1 % paste, , Disp: , Rfl:      PAST MEDICAL HISTORY    PAST MEDICAL HISTORY: No past medical history on file.    PAST SURGICAL HISTORY    PAST SURGICAL HISTORY:   Past Surgical History:   Procedure Laterality Date     WISDOM TOOTH EXTRACTION         FAMILY  HISTORY    FAMILY HISTORY:   Family History   Problem Relation Age of Onset     Vertigo Mother       Hyperlipidemia Father      Alcoholism Brother      Alzheimer Disease Maternal Grandfather      Diabetes Type 2  Paternal Grandfather      Cerebrovascular Disease Paternal Grandfather         stroke       SOCIAL HISTORY    SOCIAL HISTORY:   Social History     Tobacco Use     Smoking status: Never     Passive exposure: Current     Smokeless tobacco: Never   Substance Use Topics     Alcohol use: Yes     Alcohol/week: 1.0 standard drink of alcohol        PHYSICAL EXAM    HEAD: Normal appearance and symmetry:  No cutaneous lesions.      NECK:  supple     EARS:    Right:   TM intact   LEFT:   TM intact    EYES:  EOMI    CN VII/XII:  intact     NOSE:     Dorsum:   prominent upper lateral cartilage   Septum:  off crest to LET  Mucosa:  cursting bilaterally            ORAL CAVITY/OROPHARYNX:     Lips:  Normal.  Tongue: normal, midline  Mucosa:   no lesions     NECK:  Trachea:  midline.              Thyroid:  normal              Adenopathy:  none        NEURO:   Alert and Oriented     GAIT AND STATION:  normal     RESPIRATORY:   Symmetry and Respiratory effort     PSYCH:  Normal mood and affect     SKIN:   warm and dry         IMPRESSION:    Encounter Diagnoses   Name Primary?     Nose abnormality      Nasal crusting Yes        RECOMMENDATIONS:      Orders Placed This Encounter   Procedures     CT Sinus w/o Contrast     Orders Placed This Encounter   Medications     triamcinolone (KENALOG) 0.1 % paste     mupirocin (BACTROBAN) 2 % external ointment     Sig: Apply a pea sized amount to inside of each nostril 3x daily with Qtip for 10 days     Dispense:  22 g     Refill:  0          Again, thank you for allowing me to participate in the care of your patient.        Sincerely,        Tony Carter MD

## 2024-07-01 NOTE — PROGRESS NOTES
CHIEF COMPLAINT: Patient presents with:  Ent Problem: Lump on right side of nose for about a year- gradually increasing size- not painful- little harder to breathe out of right nostril. No imaging done.          HISTORY OF PRESENT ILLNESS    Garcia was seen at the behest of Radha Vigil CNP for nose concern.  He is scheduled to Dermatology in October of this year.      REFERRAL NOTE:    HENT: normal cephalic/atraumatic, ear canals and TM's normal, nose and mouth without ulcers or lesions, oropharynx clear, oral mucous membranes moist, and firm lump on right side of nose      REVIEW OF SYSTEMS    Review of Systems as per HPI and PMHx, otherwise 10 system review system are negative.       ALLERGIES    Bee venom protein (honey bee) [bee venom]    CURRENT MEDICATIONS      Current Outpatient Medications:     EPINEPHrine (ANY BX GENERIC EQUIV) 0.3 MG/0.3ML injection 2-pack, Inject 0.3 mLs (0.3 mg) into the muscle as needed for other (bee sting), Disp: 2 each, Rfl: 3    magic mouthwash suspension (diphenhydrAMINE, lidocaine, aluminum-magnesium & simethicone), Swish and spit 10 mLs in mouth every 6 hours as needed for mouth sores, Disp: 300 mL, Rfl: 0    mupirocin (BACTROBAN) 2 % external ointment, Apply a pea sized amount to inside of each nostril 3x daily with Qtip for 10 days, Disp: 22 g, Rfl: 0    triamcinolone (KENALOG) 0.1 % paste, , Disp: , Rfl:      PAST MEDICAL HISTORY    PAST MEDICAL HISTORY: No past medical history on file.    PAST SURGICAL HISTORY    PAST SURGICAL HISTORY:   Past Surgical History:   Procedure Laterality Date    WISDOM TOOTH EXTRACTION         FAMILY  HISTORY    FAMILY HISTORY:   Family History   Problem Relation Age of Onset    Vertigo Mother     Hyperlipidemia Father     Alcoholism Brother     Alzheimer Disease Maternal Grandfather     Diabetes Type 2  Paternal Grandfather     Cerebrovascular Disease Paternal Grandfather         stroke       SOCIAL HISTORY    SOCIAL HISTORY:   Social History      Tobacco Use    Smoking status: Never     Passive exposure: Current    Smokeless tobacco: Never   Substance Use Topics    Alcohol use: Yes     Alcohol/week: 1.0 standard drink of alcohol        PHYSICAL EXAM    HEAD: Normal appearance and symmetry:  No cutaneous lesions.      NECK:  supple     EARS:    Right:   TM intact   LEFT:   TM intact    EYES:  EOMI    CN VII/XII:  intact     NOSE:     Dorsum:   prominent upper lateral cartilage   Septum:  off crest to LET  Mucosa:  cursting bilaterally            ORAL CAVITY/OROPHARYNX:     Lips:  Normal.  Tongue: normal, midline  Mucosa:   no lesions     NECK:  Trachea:  midline.              Thyroid:  normal              Adenopathy:  none        NEURO:   Alert and Oriented     GAIT AND STATION:  normal     RESPIRATORY:   Symmetry and Respiratory effort     PSYCH:  Normal mood and affect     SKIN:   warm and dry         IMPRESSION:    Encounter Diagnoses   Name Primary?    Nose abnormality     Nasal crusting Yes        RECOMMENDATIONS:      Orders Placed This Encounter   Procedures    CT Sinus w/o Contrast     Orders Placed This Encounter   Medications    triamcinolone (KENALOG) 0.1 % paste    mupirocin (BACTROBAN) 2 % external ointment     Sig: Apply a pea sized amount to inside of each nostril 3x daily with Qtip for 10 days     Dispense:  22 g     Refill:  0

## 2024-07-09 ENCOUNTER — HOSPITAL ENCOUNTER (OUTPATIENT)
Dept: CT IMAGING | Facility: HOSPITAL | Age: 34
Discharge: HOME | End: 2024-07-09
Attending: OTOLARYNGOLOGY | Admitting: OTOLARYNGOLOGY
Payer: COMMERCIAL

## 2024-07-09 DIAGNOSIS — Q30.9 NOSE ABNORMALITY: ICD-10-CM

## 2024-07-09 PROCEDURE — 70486 CT MAXILLOFACIAL W/O DYE: CPT

## 2024-10-29 ENCOUNTER — OFFICE VISIT (OUTPATIENT)
Dept: DERMATOLOGY | Facility: CLINIC | Age: 34
End: 2024-10-29
Attending: NURSE PRACTITIONER
Payer: COMMERCIAL

## 2024-10-29 DIAGNOSIS — D49.2 NEOPLASM OF UNSPECIFIED BEHAVIOR OF BONE, SOFT TISSUE, AND SKIN: ICD-10-CM

## 2024-10-29 DIAGNOSIS — Z12.83 ENCOUNTER FOR SCREENING FOR MALIGNANT NEOPLASM OF SKIN: Primary | ICD-10-CM

## 2024-10-29 DIAGNOSIS — D22.9 MULTIPLE NEVI: ICD-10-CM

## 2024-10-29 DIAGNOSIS — L81.4 LENTIGINES: ICD-10-CM

## 2024-10-29 PROCEDURE — 88305 TISSUE EXAM BY PATHOLOGIST: CPT | Mod: 26 | Performed by: DERMATOLOGY

## 2024-10-29 PROCEDURE — 99203 OFFICE O/P NEW LOW 30 MIN: CPT | Mod: 25 | Performed by: PHYSICIAN ASSISTANT

## 2024-10-29 PROCEDURE — 11103 TANGNTL BX SKIN EA SEP/ADDL: CPT | Performed by: PHYSICIAN ASSISTANT

## 2024-10-29 PROCEDURE — 11102 TANGNTL BX SKIN SINGLE LES: CPT | Performed by: PHYSICIAN ASSISTANT

## 2024-10-29 PROCEDURE — 88305 TISSUE EXAM BY PATHOLOGIST: CPT | Mod: TC | Performed by: PHYSICIAN ASSISTANT

## 2024-10-29 ASSESSMENT — PAIN SCALES - GENERAL: PAINLEVEL_OUTOF10: NO PAIN (0)

## 2024-10-29 NOTE — PROGRESS NOTES
[facility] Dermatology Note  Encounter Date: Oct 29, 2024  Office Visit     Reviewed patients past medical history and pertinent chart review prior to patients visit today.     Dermatology Problem List:  # NUB, left lower back x2, shave biopsy 10/29/2024   # Lesion to monitor, left clavicle  - Photo 10/29/2024   ____________________________________________    Assessment & Plan:     # Neoplasm of uncertain behavior:  left lower back (A, superior)  DDx includes intradermal nevus vs other. Shave biopsy today.  # Neoplasm of uncertain behavior:  left lower back (B, inferior)  DDx includes intradermal nevus vs other. Shave biopsy today.    Procedure Note: Biopsy by shave technique  The risks and benefits of the procedure were described to the patient. These include but are not limited to bleeding, infection, scar, incomplete removal, and non-diagnostic biopsy. Verbal informed consent was obtained. The above site(s) was cleansed with an alcohol pad and injected with 1% lidocaine with epinephrine. Once anesthesia was obtained, a biopsy(ies) was performed with Gilette blade. The tissue(s) was placed in a labeled container(s) with formalin and sent to pathology. Hemostasis was achieved with aluminum chloride. Vaseline and a bandage were applied to the wound(s). The patient tolerated the procedure well and was given post biopsy care instructions.     # Lesion to monitor, left clavicle  - Patient declined biopsy today. Continue to monitor. Photo 10/29/2024     # Multiple nevi, trunk and extremities  # Solar lentigines  - No concerning features on dermoscopy. We discussed the importance of self exams at home. ABCDE criteria and importance of photoprotection reviewed.     Follow-up:  Annual for follow up full body skin exam, as needed for new or changing lesions or new concerns    All risks, benefits and alternatives were discussed with patient.  Patient is in agreement and understands the assessment and  plan.  All questions were answered.  Cleina Bang PA-C  [facility]at[facility]    ____________________________________________    CC: Skin Check (Garcia is here today for a skin check and he is concerned about lesions on his back. )    HPI:  Mr. Garcia Sanches is a(n) 34 year old male who presents today as a new patient for a full body skin cancer screening. The patient requests evaluation of moles on the back. The moles have become more raised in recent years. He is interested in removal. No other specific cutaneous concerns today. The patient reports trying to be diligent with photoprotection.      Physical Exam:  Vitals: There were no vitals taken for this visit.  SKIN: Total skin excluding the genitalia areas was performed. The exam included the scalp, face, neck, bilateral arms, chest, back, abdomen, bilateral legs, digits, mons pubis, buttocks, and nails.   - Fan II.  - The left clavicle demonstrates a tan macule with irregular boarders, homogenous pigment on dermoscopy.   - Multiple tan/brown macules and papules scattered throughout exam, consistent with benign nevi. No concerning features on dermoscopy.   - Scattered tan, homogenous macules scattered on sun exposed skin, consistent with solar lentigines.     Medications:  Current Outpatient Medications   Medication Sig Dispense Refill    EPINEPHrine (ANY BX GENERIC EQUIV) 0.3 MG/0.3ML injection 2-pack Inject 0.3 mLs (0.3 mg) into the muscle as needed for other (bee sting) 2 each 3    magic mouthwash suspension (diphenhydrAMINE, lidocaine, aluminum-magnesium & simethicone) Swish and spit 10 mLs in mouth every 6 hours as needed for mouth sores 300 mL 0    mupirocin (BACTROBAN) 2 % external ointment Apply a pea sized amount to inside of each nostril 3x daily with Qtip for 10 days 22 g 0    triamcinolone (KENALOG) 0.1 % paste        No current facility-administered medications for this visit.      Past Medical History:   Patient Active Problem List    Diagnosis   (none) - all problems resolved or deleted     History reviewed. No pertinent past medical history.    CC Radha Trotter, CNP  [address] [place] [address] on close of this encounter.

## 2024-10-29 NOTE — PATIENT INSTRUCTIONS
Patient Education        Proper skin care from [facility]:     -Eliminate harsh soaps as they strip the natural oils from the skin, often resulting in dry itchy skin ( i.e. Dial, Zest, [place])  -Use mild soaps such as Cetaphil or Dove Sensitive Skin in the shower. You do not need to use soap on arms, legs, and trunk every time you shower unless visibly soiled.   -Avoid hot or cold showers.  -After showering, lightly dry off and apply moisturizing within 2-3 minutes. This will help trap moisture in the skin.   -Aggressive use of a moisturizer at least 1-2 times a day to the entire body (including -Vanicream, Cetaphil, Aquaphor or Cerave) and moisturize hands after every washing.  -We recommend using moisturizers that come in a tub that needs to be scooped out, not a pump. This has more of an oil base. It will hold moisture in your skin much better than a water base moisturizer. The above recommended are non-pore clogging.        Wear a sunscreen with at least SPF 30 on your face, ears, neck and V of the chest daily. Wear sunscreen on other areas of the body if those areas are exposed to the sun throughout the day. Sunscreens can contain physical and/or chemical blockers. Physical blockers are less likely to clog pores, these include zinc oxide and titanium dioxide. Reapply every two hour and after swimming.      Sunscreen examples: https://www.ewg.org/sunscreen/     UV radiation  UVA radiation remains constant throughout the day and throughout the year. It is a longer wavelength than UVB and therefore penetrates deeper into the skin leading to immediate and delayed tanning, photoaging, and skin cancer. 70-80% of UVA and UVB radiation occurs between the hours of 10am-2pm.  UVB radiation  UVB radiation causes the most harmful effects and is more significant during the summer months. However, snow and ice can reflect UVB radiation leading to skin damage during the winter months as well. UVB radiation is  responsible for tanning, burning, inflammation, delayed erythema (pinkness), pigmentation (brown spots), and skin cancer.      I recommend self monthly full body exams and yearly full body exams with a dermatology provider. If you develop a new or changing lesion please follow up for examination. Most skin cancers are pink and scaly or pink and pearly. However, we do see blue/brown/black skin cancers.  Consider the ABCDEs of melanoma when giving yourself your monthly full body exam ( don't forget the groin, buttocks, feet, toes, etc). A-asymmetry, B-borders, C-color, D-diameter, E-elevation or evolving. If you see any of these changes please follow up in clinic. If you cannot see your back I recommend purchasing a hand held mirror to use with a larger wall mirror.       Checking for Skin Cancer  You can find cancer early by checking your skin each month. There are 3 kinds of skin cancer. They are melanoma, basal cell carcinoma, and squamous cell carcinoma. Doing monthly skin checks is the best way to find new marks or skin changes. Follow the instructions below for checking your skin.   The ABCDEs of checking moles for melanoma   Check your moles or growths for signs of melanoma using ABCDE:   Asymmetry: the sides of the mole or growth don t match  Border: the edges are ragged, notched, or blurred  Color: the color within the mole or growth varies  Diameter: the mole or growth is larger than 6 mm (size of a pencil eraser)  Evolving: the size, shape, or color of the mole or growth is changing (evolving is not shown in the images below)    Checking for other types of skin cancer  Basal cell carcinoma or squamous cell carcinoma have symptoms such as:      A spot or mole that looks different from all other marks on your skin  Changes in how an area feels, such as itching, tenderness, or pain  Changes in the skin's surface, such as oozing, bleeding, or scaliness  A sore that does not heal  New swelling or redness beyond  the border of a mole     Who s at risk?  Anyone can get skin cancer. But you are at greater risk if you have:   Fair skin, light-colored hair, or light-colored eyes  Many moles or abnormal moles on your skin  A history of sunburns from sunlight or tanning beds  A family history of skin cancer  A history of exposure to radiation or chemicals  A weakened immune system  If you have had skin cancer in the past, you are at risk for recurring skin cancer.   How to check your skin  Do your monthly skin checkups in front of a full-length mirror. Check all parts of your body, including your:   Head (ears, face, neck, and scalp)  Torso (front, back, and sides)  Arms (tops, undersides, upper, and lower armpits)  Hands (palms, backs, and fingers, including under the nails)  Buttocks and genitals  Legs (front, back, and sides)  Feet (tops, soles, toes, including under the nails, and between toes)  If you have a lot of moles, take digital photos of them each month. Make sure to take photos both up close and from a distance. These can help you see if any moles change over time.   Most skin changes are not cancer. But if you see any changes in your skin, call your doctor right away. Only he or she can diagnose a problem. If you have skin cancer, seeing your doctor can be the first step toward getting the treatment that could save your life.   [employer] last reviewed this educational content on 4/1/2019 2000-2020 [employer]. [address] [place] [address], [place] [address]. All rights reserved. This information is not intended as a substitute for professional medical care. Always follow your healthcare professional's instructions.        When should I call my doctor?  If you are worsening or not improving, please, contact us or seek urgent care as noted below.      Who should I call with questions (adults)?  [facility] (adult and pediatric): [phone]  [facility]  [place] (adult): [phone]  [facility] ([place], [place], [place] and [place]) [phone]  For urgent needs outside of business hours call the [facility] at [phone] and ask for the dermatology resident on call to be paged  If this is a medical emergency and you are unable to reach an ER, Call 911        If you need a prescription refill, please contact your pharmacy. Refills are approved or denied by our Physicians during normal business hours, Monday through Fridays  Per office policy, refills will not be granted if you have not been seen within the past year (or sooner depending on your child's condition)

## 2024-10-29 NOTE — LETTER
10/29/2024       RE: Garcia Sanches  [address]  [facility] [address]     Dear Colleague,    Thank you for referring your patient, Garcia Sanches, to the [facility]ERMAT[facility] [place] [facility]. Please see a copy of my visit note below.    [facility] Dermatology Note  Encounter Date: Oct 29, 2024  Office Visit     Reviewed patients past medical history and pertinent chart review prior to patients visit today.     Dermatology Problem List:  # NUB, left lower back x2, shave biopsy 10/29/2024   # Lesion to monitor, left clavicle  - Photo 10/29/2024   ____________________________________________    Assessment & Plan:     # Neoplasm of uncertain behavior:  left lower back (A, superior)  DDx includes intradermal nevus vs other. Shave biopsy today.  # Neoplasm of uncertain behavior:  left lower back (B, inferior)  DDx includes intradermal nevus vs other. Shave biopsy today.    Procedure Note: Biopsy by shave technique  The risks and benefits of the procedure were described to the patient. These include but are not limited to bleeding, infection, scar, incomplete removal, and non-diagnostic biopsy. Verbal informed consent was obtained. The above site(s) was cleansed with an alcohol pad and injected with 1% lidocaine with epinephrine. Once anesthesia was obtained, a biopsy(ies) was performed with Gilette blade. The tissue(s) was placed in a labeled container(s) with formalin and sent to pathology. Hemostasis was achieved with aluminum chloride. Vaseline and a bandage were applied to the wound(s). The patient tolerated the procedure well and was given post biopsy care instructions.     # Lesion to monitor, left clavicle  - Patient declined biopsy today. Continue to monitor. Photo 10/29/2024     # Multiple nevi, trunk and extremities  # Solar lentigines  - No concerning features on dermoscopy. We discussed the importance of self  exams at home. ABCDE criteria and importance of photoprotection reviewed.     Follow-up:  Annual for follow up full body skin exam, as needed for new or changing lesions or new concerns    All risks, benefits and alternatives were discussed with patient.  Patient is in agreement and understands the assessment and plan.  All questions were answered.  Celina Bang PA-C  [facility]    ____________________________________________    CC: Skin Check (Garcia is here today for a skin check and he is concerned about lesions on his back. )    HPI:  Mr. Garcia Sanches is a(n) 34 year old male who presents today as a new patient for a full body skin cancer screening. The patient requests evaluation of moles on the back. The moles have become more raised in recent years. He is interested in removal. No other specific cutaneous concerns today. The patient reports trying to be diligent with photoprotection.      Physical Exam:  Vitals: There were no vitals taken for this visit.  SKIN: Total skin excluding the genitalia areas was performed. The exam included the scalp, face, neck, bilateral arms, chest, back, abdomen, bilateral legs, digits, mons pubis, buttocks, and nails.   - Fan II.  - The left clavicle demonstrates a tan macule with irregular boarders, homogenous pigment on dermoscopy.   - Multiple tan/brown macules and papules scattered throughout exam, consistent with benign nevi. No concerning features on dermoscopy.   - Scattered tan, homogenous macules scattered on sun exposed skin, consistent with solar lentigines.     Medications:  Current Outpatient Medications   Medication Sig Dispense Refill     EPINEPHrine (ANY BX GENERIC EQUIV) 0.3 MG/0.3ML injection 2-pack Inject 0.3 mLs (0.3 mg) into the muscle as needed for other (bee sting) 2 each 3     magic mouthwash suspension (diphenhydrAMINE, lidocaine, aluminum-magnesium & simethicone) Swish and spit 10 mLs in mouth every 6 hours as needed for  mouth sores 300 mL 0     mupirocin (BACTROBAN) 2 % external ointment Apply a pea sized amount to inside of each nostril 3x daily with Qtip for 10 days 22 g 0     triamcinolone (KENALOG) 0.1 % paste        No current facility-administered medications for this visit.      Past Medical History:   Patient Active Problem List   Diagnosis   (none) - all problems resolved or deleted     History reviewed. No pertinent past medical history.    CC Radha Trotter, CNP  [address] [place] [address] on close of this encounter.      Again, thank you for allowing me to participate in the care of your patient.      Sincerely,    Celina Bang PA-C

## 2024-10-29 NOTE — NURSING NOTE
Dermatology Rooming Note    Garcia Sanches's goals for this visit include:   Chief Complaint   Patient presents with    Skin Check     Garcia is here today for a skin check and he is concerned about lesions on his back.      Jazmyn DUNBAR CMA

## 2024-10-29 NOTE — NURSING NOTE
Lidocaine-epinephrine 1-1:142944 % injection   1.5mL once for one use, starting 10/29/2024 ending 10/29/2024,  2mL disp, R-0, injection  Injected by Jazmyn DUNBAR CMA

## 2024-11-01 LAB
PATH REPORT.COMMENTS IMP SPEC: NORMAL
PATH REPORT.COMMENTS IMP SPEC: NORMAL
PATH REPORT.FINAL DX SPEC: NORMAL
PATH REPORT.GROSS SPEC: NORMAL
PATH REPORT.MICROSCOPIC SPEC OTHER STN: NORMAL
PATH REPORT.RELEVANT HX SPEC: NORMAL

## 2025-02-03 ENCOUNTER — OFFICE VISIT (OUTPATIENT)
Dept: URGENT CARE | Facility: URGENT CARE | Age: 35
End: 2025-02-03
Payer: COMMERCIAL

## 2025-02-03 VITALS
SYSTOLIC BLOOD PRESSURE: 158 MMHG | RESPIRATION RATE: 18 BRPM | TEMPERATURE: 97.4 F | HEART RATE: 68 BPM | OXYGEN SATURATION: 98 % | WEIGHT: 180 LBS | BODY MASS INDEX: 23.75 KG/M2 | DIASTOLIC BLOOD PRESSURE: 80 MMHG

## 2025-02-03 DIAGNOSIS — H69.92 DYSFUNCTION OF LEFT EUSTACHIAN TUBE: Primary | ICD-10-CM

## 2025-02-03 PROCEDURE — 99213 OFFICE O/P EST LOW 20 MIN: CPT | Performed by: PHYSICIAN ASSISTANT

## 2025-02-03 NOTE — PROGRESS NOTES
SUBJECTIVE:  Garcia Sanches is a 34 year old male comes in with a 3-day history of left ear pain.  He mostly notices it when he is chewing and some when he is laying on his left side.  He states that he can hear his heartbeat in his ear.  Does state that he has slight decreased hearing.  No significant recent cold symptoms.  He does state that both of his kids recently had an ear infection.  He has no other symptoms.  He is otherwise at baseline health.  No meds have been given.  Does have a history of ear issues when he was.    No past medical history on file.  Patient Active Problem List   Diagnosis   (none) - all problems resolved or deleted     Current Outpatient Medications   Medication Sig Dispense Refill    EPINEPHrine (ANY BX GENERIC EQUIV) 0.3 MG/0.3ML injection 2-pack Inject 0.3 mLs (0.3 mg) into the muscle as needed for other (bee sting) 2 each 3     No current facility-administered medications for this visit.     Social History     Socioeconomic History    Marital status:      Spouse name: Not on file    Number of children: Not on file    Years of education: Not on file    Highest education level: Not on file   Occupational History    Not on file   Tobacco Use    Smoking status: Never     Passive exposure: Current    Smokeless tobacco: Never   Vaping Use    Vaping status: Never Used   Substance and Sexual Activity    Alcohol use: Yes     Alcohol/week: 1.0 standard drink of alcohol    Drug use: Never    Sexual activity: Yes     Partners: Female     Birth control/protection: None   Other Topics Concern    Not on file   Social History Narrative    Not on file     Social Drivers of Health     Financial Resource Strain: Low Risk  (3/27/2024)    Financial Resource Strain     Within the past 12 months, have you or your family members you live with been unable to get utilities (heat, electricity) when it was really needed?: No   Food Insecurity: Low Risk  (3/27/2024)    Food Insecurity     Within the past  12 months, did you worry that your food would run out before you got money to buy more?: No     Within the past 12 months, did the food you bought just not last and you didn t have money to get more?: No   Transportation Needs: Low Risk  (3/27/2024)    Transportation Needs     Within the past 12 months, has lack of transportation kept you from medical appointments, getting your medicines, non-medical meetings or appointments, work, or from getting things that you need?: No   Physical Activity: Insufficiently Active (3/27/2024)    Exercise Vital Sign     Days of Exercise per Week: 1 day     Minutes of Exercise per Session: 10 min   Stress: No Stress Concern Present (3/27/2024)    [employer] - Occupational Stress Questionnaire     Feeling of Stress : Only a little   Social Connections: Unknown (3/27/2024)    Social Connection and Isolation Panel [NHANES]     Frequency of Communication with Friends and Family: Not on file     Frequency of Social Gatherings with Friends and Family: Twice a week     Attends Spiritism Services: Not on file     Active Member of Clubs or Organizations: Not on file     Attends Club or Organization Meetings: Not on file     Marital Status: Not on file   Interpersonal Safety: Low Risk  (3/27/2024)    Interpersonal Safety     Do you feel physically and emotionally safe where you currently live?: Yes     Within the past 12 months, have you been hit, slapped, kicked or otherwise physically hurt by someone?: No     Within the past 12 months, have you been humiliated or emotionally abused in other ways by your partner or ex-partner?: No   Housing Stability: Low Risk  (3/27/2024)    Housing Stability     Do you have housing? : Yes     Are you worried about losing your housing?: No     ROS  negative other than stated above    Exam:  GENERAL APPEARANCE: healthy, alert and no distress  EYES: EOMI,  PERRL  HENT: Right TM canal is clear.  Left TM with no erythema but fluid  noted behind the tympanic membrane.  Canal is clear.  Oral Ko[place] moist with no erythema or exudate noted.  No sinus pain or significant congestion..  NECK: no adenopathy, no asymmetry, masses, or scars and thyroid normal to palpation  RESP: lungs clear to auscultation - no rales, rhonchi or wheezes  CV: regular rates and rhythm, normal S1 S2, no S3 or S4 and no murmur, click or rub -  SKIN: no suspicious lesions or rashes    assessment/plan:  (H69.92) Dysfunction of left eustachian tube  (primary encounter diagnosis)  Comment:   Plan: Today history of left ear symptoms.  There is no evidence for infection at this time.  Does have some fluid in his ear consistent with eustachian tube dysfunction.  Over-the-counter meds were discussed for supportive cares.  Including Flonase.  This is limiting will resolve on its own.  Other over-the-counter med if needed for pain.  Will follow-up if symptoms worsen or new symptoms develop.

## 2025-05-03 ENCOUNTER — HEALTH MAINTENANCE LETTER (OUTPATIENT)
Age: 35
End: 2025-05-03